# Patient Record
Sex: MALE | Race: WHITE | NOT HISPANIC OR LATINO | URBAN - METROPOLITAN AREA
[De-identification: names, ages, dates, MRNs, and addresses within clinical notes are randomized per-mention and may not be internally consistent; named-entity substitution may affect disease eponyms.]

---

## 2014-10-03 RX ORDER — METOPROLOL TARTRATE 50 MG
2 TABLET ORAL
Qty: 0 | Refills: 0 | COMMUNITY
Start: 2014-10-03

## 2017-07-23 ENCOUNTER — EMERGENCY (EMERGENCY)
Facility: HOSPITAL | Age: 82
LOS: 1 days | Discharge: HOME | End: 2017-07-23
Attending: EMERGENCY MEDICINE | Admitting: EMERGENCY MEDICINE
Payer: MEDICARE

## 2017-07-23 VITALS
HEART RATE: 79 BPM | TEMPERATURE: 98 F | SYSTOLIC BLOOD PRESSURE: 207 MMHG | DIASTOLIC BLOOD PRESSURE: 83 MMHG | RESPIRATION RATE: 16 BRPM | OXYGEN SATURATION: 99 %

## 2017-07-23 VITALS
DIASTOLIC BLOOD PRESSURE: 88 MMHG | SYSTOLIC BLOOD PRESSURE: 185 MMHG | OXYGEN SATURATION: 97 % | HEART RATE: 74 BPM | TEMPERATURE: 98 F | RESPIRATION RATE: 16 BRPM

## 2017-07-23 LAB
ALBUMIN SERPL ELPH-MCNC: 4.4 G/DL — SIGNIFICANT CHANGE UP (ref 3.3–5)
ALP SERPL-CCNC: 55 U/L — SIGNIFICANT CHANGE UP (ref 40–120)
ALT FLD-CCNC: 16 U/L RC — SIGNIFICANT CHANGE UP (ref 10–45)
ANION GAP SERPL CALC-SCNC: 16 MMOL/L — SIGNIFICANT CHANGE UP (ref 5–17)
APPEARANCE UR: CLEAR — SIGNIFICANT CHANGE UP
AST SERPL-CCNC: 26 U/L — SIGNIFICANT CHANGE UP (ref 10–40)
BASOPHILS # BLD AUTO: 0.1 K/UL — SIGNIFICANT CHANGE UP (ref 0–0.2)
BASOPHILS NFR BLD AUTO: 1.6 % — SIGNIFICANT CHANGE UP (ref 0–2)
BILIRUB SERPL-MCNC: 0.9 MG/DL — SIGNIFICANT CHANGE UP (ref 0.2–1.2)
BILIRUB UR-MCNC: NEGATIVE — SIGNIFICANT CHANGE UP
BUN SERPL-MCNC: 22 MG/DL — SIGNIFICANT CHANGE UP (ref 7–23)
CALCIUM SERPL-MCNC: 9.4 MG/DL — SIGNIFICANT CHANGE UP (ref 8.4–10.5)
CHLORIDE SERPL-SCNC: 102 MMOL/L — SIGNIFICANT CHANGE UP (ref 96–108)
CO2 SERPL-SCNC: 22 MMOL/L — SIGNIFICANT CHANGE UP (ref 22–31)
COLOR SPEC: COLORLESS — SIGNIFICANT CHANGE UP
CREAT SERPL-MCNC: 1.44 MG/DL — HIGH (ref 0.5–1.3)
DIFF PNL FLD: NEGATIVE — SIGNIFICANT CHANGE UP
EOSINOPHIL # BLD AUTO: 0.1 K/UL — SIGNIFICANT CHANGE UP (ref 0–0.5)
EOSINOPHIL NFR BLD AUTO: 2.1 % — SIGNIFICANT CHANGE UP (ref 0–6)
GLUCOSE SERPL-MCNC: 123 MG/DL — HIGH (ref 70–99)
GLUCOSE UR QL: NEGATIVE — SIGNIFICANT CHANGE UP
HCT VFR BLD CALC: 43 % — SIGNIFICANT CHANGE UP (ref 39–50)
HGB BLD-MCNC: 14.7 G/DL — SIGNIFICANT CHANGE UP (ref 13–17)
KETONES UR-MCNC: NEGATIVE — SIGNIFICANT CHANGE UP
LEUKOCYTE ESTERASE UR-ACNC: NEGATIVE — SIGNIFICANT CHANGE UP
LYMPHOCYTES # BLD AUTO: 2.1 K/UL — SIGNIFICANT CHANGE UP (ref 1–3.3)
LYMPHOCYTES # BLD AUTO: 38.2 % — SIGNIFICANT CHANGE UP (ref 13–44)
MAGNESIUM SERPL-MCNC: 2.1 MG/DL — SIGNIFICANT CHANGE UP (ref 1.6–2.6)
MCHC RBC-ENTMCNC: 32.1 PG — SIGNIFICANT CHANGE UP (ref 27–34)
MCHC RBC-ENTMCNC: 34.1 GM/DL — SIGNIFICANT CHANGE UP (ref 32–36)
MCV RBC AUTO: 94.2 FL — SIGNIFICANT CHANGE UP (ref 80–100)
MONOCYTES # BLD AUTO: 0.8 K/UL — SIGNIFICANT CHANGE UP (ref 0–0.9)
MONOCYTES NFR BLD AUTO: 14.3 % — HIGH (ref 2–14)
NEUTROPHILS # BLD AUTO: 2.4 K/UL — SIGNIFICANT CHANGE UP (ref 1.8–7.4)
NEUTROPHILS NFR BLD AUTO: 43.8 % — SIGNIFICANT CHANGE UP (ref 43–77)
NITRITE UR-MCNC: NEGATIVE — SIGNIFICANT CHANGE UP
PH UR: 6.5 — SIGNIFICANT CHANGE UP (ref 5–8)
PHOSPHATE SERPL-MCNC: 2.8 MG/DL — SIGNIFICANT CHANGE UP (ref 2.5–4.5)
PLATELET # BLD AUTO: 136 K/UL — LOW (ref 150–400)
POTASSIUM SERPL-MCNC: 4.4 MMOL/L — SIGNIFICANT CHANGE UP (ref 3.5–5.3)
POTASSIUM SERPL-SCNC: 4.4 MMOL/L — SIGNIFICANT CHANGE UP (ref 3.5–5.3)
PROT SERPL-MCNC: 7.9 G/DL — SIGNIFICANT CHANGE UP (ref 6–8.3)
PROT UR-MCNC: 100 MG/DL
RBC # BLD: 4.57 M/UL — SIGNIFICANT CHANGE UP (ref 4.2–5.8)
RBC # FLD: 12.8 % — SIGNIFICANT CHANGE UP (ref 10.3–14.5)
RBC CASTS # UR COMP ASSIST: SIGNIFICANT CHANGE UP /HPF (ref 0–2)
SODIUM SERPL-SCNC: 140 MMOL/L — SIGNIFICANT CHANGE UP (ref 135–145)
SP GR SPEC: 1.01 — SIGNIFICANT CHANGE UP (ref 1.01–1.02)
T3 SERPL-MCNC: 96 NG/DL — SIGNIFICANT CHANGE UP (ref 80–200)
T4 AB SER-ACNC: 7 UG/DL — SIGNIFICANT CHANGE UP (ref 4.6–12)
TROPONIN T SERPL-MCNC: <0.01 NG/ML — SIGNIFICANT CHANGE UP (ref 0–0.06)
TSH SERPL-MCNC: 2.04 UIU/ML — SIGNIFICANT CHANGE UP (ref 0.27–4.2)
UROBILINOGEN FLD QL: NEGATIVE — SIGNIFICANT CHANGE UP
WBC # BLD: 5.6 K/UL — SIGNIFICANT CHANGE UP (ref 3.8–10.5)
WBC # FLD AUTO: 5.6 K/UL — SIGNIFICANT CHANGE UP (ref 3.8–10.5)
WBC UR QL: SIGNIFICANT CHANGE UP /HPF (ref 0–5)

## 2017-07-23 PROCEDURE — 70450 CT HEAD/BRAIN W/O DYE: CPT | Mod: 26

## 2017-07-23 PROCEDURE — 84484 ASSAY OF TROPONIN QUANT: CPT

## 2017-07-23 PROCEDURE — 99284 EMERGENCY DEPT VISIT MOD MDM: CPT | Mod: GC

## 2017-07-23 PROCEDURE — 81001 URINALYSIS AUTO W/SCOPE: CPT

## 2017-07-23 PROCEDURE — 84480 ASSAY TRIIODOTHYRONINE (T3): CPT

## 2017-07-23 PROCEDURE — 93005 ELECTROCARDIOGRAM TRACING: CPT

## 2017-07-23 PROCEDURE — 85027 COMPLETE CBC AUTOMATED: CPT

## 2017-07-23 PROCEDURE — 84436 ASSAY OF TOTAL THYROXINE: CPT

## 2017-07-23 PROCEDURE — 84443 ASSAY THYROID STIM HORMONE: CPT

## 2017-07-23 PROCEDURE — 71046 X-RAY EXAM CHEST 2 VIEWS: CPT

## 2017-07-23 PROCEDURE — 84100 ASSAY OF PHOSPHORUS: CPT

## 2017-07-23 PROCEDURE — 71020: CPT | Mod: 26

## 2017-07-23 PROCEDURE — 70450 CT HEAD/BRAIN W/O DYE: CPT

## 2017-07-23 PROCEDURE — 80053 COMPREHEN METABOLIC PANEL: CPT

## 2017-07-23 PROCEDURE — 83735 ASSAY OF MAGNESIUM: CPT

## 2017-07-23 PROCEDURE — 99284 EMERGENCY DEPT VISIT MOD MDM: CPT | Mod: 25

## 2017-07-23 RX ORDER — ASPIRIN/CALCIUM CARB/MAGNESIUM 324 MG
0 TABLET ORAL
Qty: 0 | Refills: 0 | COMMUNITY

## 2017-07-23 NOTE — ED ADULT NURSE NOTE - PMH
Diabetes    HLD (hyperlipidemia)    HTN (hypertension)    Murmur, heart    TIA (transient ischemic attack)

## 2017-07-23 NOTE — ED PROVIDER NOTE - PROGRESS NOTE DETAILS
Dr. Kenyon Note: pt feeling well, BP down to 160s, no symptoms with walking.  Pt and daughter does not want to stay in hospital, will f/u with cardiologist, will try 2 cardiologist (including Dr. Reed).  Offered to call their cardiologist, but declined, will fu themselves.  Promises to return if more symptomatic or unable to f/u this week.  Aware of concern for symptomatic bradycardia although not bradycardic currently, and possibility of pacemaker need, risk for syncope in future.  Daughter is hospice physician and competent to make this shared decision.  Stable for dc, copy of labs and EKG to patient.

## 2017-07-23 NOTE — ED ADULT NURSE NOTE - OBJECTIVE STATEMENT
Pt c/o feeling lightheaded, BP measured at 200/100 at home with reported pulse in 30s.  Takes metoprolol 50 in am and 25 pm, ramipril 5mg x2.  Taking as instructed.  +generalized mild headache, denies vision changes, cp, sob, abd pain, nvd, numbness/tingling.  Lives at home with daughter.  Fell 1 week ago while walking the dog, feet got tangled, mechanical fall, denies LOC. Pt c/o feeling lightheaded, BP measured at 200/100 at home with reported pulse in 30s, taken by daughter who states she is a doctor.  Pt with hx of HTN takes metoprolol 50 in am and 25 pm, ramipril 5mg am and pm, taking all meds as instructed, no changes in dosing.  Lightheadedness has been "all day," not worse with walking, +generalized mild headache, denies vision changes, cp, sob, abd pain, nvd, numbness/tingling.  Lives at home with daughter.  Fell 1 week ago while walking the dog, feet got tangled, mechanical fall, denies LOC. Pt c/o feeling lightheaded, BP measured at 200/100 at home with reported pulse in 30s, taken by daughter who states she is a doctor.  Pt with hx of HTN takes metoprolol 50 in am and 25 pm, ramipril 5mg am and pm, taking all meds as instructed, no changes in dosing.  Lightheadedness has been "all day," not worse with walking, +generalized mild headache, denies vision changes, cp, sob, abd pain, nvd, numbness/tingling.  Lives at home with daughter who states pt is acting his normal self, no confusion, no slurred speech or facial droop.  Fell 1 week ago while walking the dog, feet got tangled, mechanical fall, denies LOC.

## 2017-07-23 NOTE — ED PROVIDER NOTE - MEDICAL DECISION MAKING DETAILS
Dr. Kenyon Note: concern for bradycardia...tele monitoring and lyte check, daughter ok with outpt has cards, for holter and echo outpt; pt with fall, no reported head inj, but check given high bp and elderly and recent fall.

## 2017-07-23 NOTE — ED PROVIDER NOTE - OBJECTIVE STATEMENT
Dr. Kenyon Note: 89M with mechanical fall on grass few days ago, but here today for elevated BP for few days to 190s along with periods of lightheadedness and noted palpable bradycardia to 30s (by pt's daughter, physician).  Intermittent episodes lasting minutes.  None currently.  +very slight headache, no changes to medications, no fever, cp, sob, ab pain, vomiting.

## 2017-07-28 ENCOUNTER — APPOINTMENT (OUTPATIENT)
Dept: CARDIOLOGY | Facility: CLINIC | Age: 82
End: 2017-07-28
Payer: MEDICARE

## 2017-07-28 ENCOUNTER — APPOINTMENT (OUTPATIENT)
Dept: CV DIAGNOSITCS | Facility: HOSPITAL | Age: 82
End: 2017-07-28

## 2017-07-28 ENCOUNTER — OUTPATIENT (OUTPATIENT)
Dept: OUTPATIENT SERVICES | Facility: HOSPITAL | Age: 82
LOS: 1 days | End: 2017-07-28
Payer: MEDICARE

## 2017-07-28 ENCOUNTER — NON-APPOINTMENT (OUTPATIENT)
Age: 82
End: 2017-07-28

## 2017-07-28 VITALS
WEIGHT: 153 LBS | HEART RATE: 50 BPM | OXYGEN SATURATION: 99 % | SYSTOLIC BLOOD PRESSURE: 175 MMHG | DIASTOLIC BLOOD PRESSURE: 62 MMHG | BODY MASS INDEX: 21.42 KG/M2 | HEIGHT: 71 IN

## 2017-07-28 DIAGNOSIS — Z95.2 PRESENCE OF PROSTHETIC HEART VALVE: ICD-10-CM

## 2017-07-28 PROCEDURE — 93306 TTE W/DOPPLER COMPLETE: CPT

## 2017-07-28 PROCEDURE — 93306 TTE W/DOPPLER COMPLETE: CPT | Mod: 26

## 2017-07-28 PROCEDURE — 99214 OFFICE O/P EST MOD 30 MIN: CPT

## 2017-07-28 PROCEDURE — 93000 ELECTROCARDIOGRAM COMPLETE: CPT

## 2017-07-28 RX ORDER — SULFAMETHOXAZOLE AND TRIMETHOPRIM 800; 160 MG/1; MG/1
800-160 TABLET ORAL
Qty: 10 | Refills: 0 | Status: DISCONTINUED | COMMUNITY
Start: 2017-06-11

## 2017-08-18 ENCOUNTER — APPOINTMENT (OUTPATIENT)
Dept: CARDIOLOGY | Facility: CLINIC | Age: 82
End: 2017-08-18
Payer: MEDICARE

## 2017-08-18 ENCOUNTER — NON-APPOINTMENT (OUTPATIENT)
Age: 82
End: 2017-08-18

## 2017-08-18 VITALS
HEART RATE: 55 BPM | BODY MASS INDEX: 21.42 KG/M2 | OXYGEN SATURATION: 100 % | HEIGHT: 71 IN | WEIGHT: 153 LBS | DIASTOLIC BLOOD PRESSURE: 80 MMHG | SYSTOLIC BLOOD PRESSURE: 173 MMHG

## 2017-08-18 DIAGNOSIS — E78.5 HYPERLIPIDEMIA, UNSPECIFIED: ICD-10-CM

## 2017-08-18 PROCEDURE — 93000 ELECTROCARDIOGRAM COMPLETE: CPT

## 2017-08-18 PROCEDURE — 99214 OFFICE O/P EST MOD 30 MIN: CPT

## 2017-08-25 ENCOUNTER — APPOINTMENT (OUTPATIENT)
Dept: CARDIOLOGY | Facility: CLINIC | Age: 82
End: 2017-08-25

## 2018-06-25 ENCOUNTER — OTHER (OUTPATIENT)
Age: 83
End: 2018-06-25

## 2018-10-05 ENCOUNTER — APPOINTMENT (OUTPATIENT)
Dept: CARDIOTHORACIC SURGERY | Facility: CLINIC | Age: 83
End: 2018-10-05

## 2018-10-05 ENCOUNTER — APPOINTMENT (OUTPATIENT)
Dept: CV DIAGNOSITCS | Facility: HOSPITAL | Age: 83
End: 2018-10-05

## 2018-11-02 ENCOUNTER — OUTPATIENT (OUTPATIENT)
Dept: OUTPATIENT SERVICES | Facility: HOSPITAL | Age: 83
LOS: 1 days | End: 2018-11-02
Payer: MEDICARE

## 2018-11-02 ENCOUNTER — NON-APPOINTMENT (OUTPATIENT)
Age: 83
End: 2018-11-02

## 2018-11-02 ENCOUNTER — APPOINTMENT (OUTPATIENT)
Dept: CARDIOTHORACIC SURGERY | Facility: CLINIC | Age: 83
End: 2018-11-02
Payer: SUBSIDIZED

## 2018-11-02 ENCOUNTER — APPOINTMENT (OUTPATIENT)
Dept: CV DIAGNOSITCS | Facility: HOSPITAL | Age: 83
End: 2018-11-02

## 2018-11-02 VITALS
HEART RATE: 58 BPM | RESPIRATION RATE: 14 BRPM | OXYGEN SATURATION: 100 % | BODY MASS INDEX: 21 KG/M2 | HEIGHT: 71 IN | DIASTOLIC BLOOD PRESSURE: 71 MMHG | TEMPERATURE: 97.5 F | SYSTOLIC BLOOD PRESSURE: 163 MMHG | WEIGHT: 150 LBS

## 2018-11-02 VITALS — SYSTOLIC BLOOD PRESSURE: 162 MMHG | DIASTOLIC BLOOD PRESSURE: 81 MMHG

## 2018-11-02 VITALS — SYSTOLIC BLOOD PRESSURE: 146 MMHG | DIASTOLIC BLOOD PRESSURE: 78 MMHG

## 2018-11-02 DIAGNOSIS — Z95.2 PRESENCE OF PROSTHETIC HEART VALVE: ICD-10-CM

## 2018-11-02 DIAGNOSIS — I10 ESSENTIAL (PRIMARY) HYPERTENSION: ICD-10-CM

## 2018-11-02 PROCEDURE — 93306 TTE W/DOPPLER COMPLETE: CPT | Mod: 26

## 2018-11-02 PROCEDURE — 93306 TTE W/DOPPLER COMPLETE: CPT

## 2018-11-02 PROCEDURE — 0: CUSTOM

## 2018-11-02 PROCEDURE — 99214 OFFICE O/P EST MOD 30 MIN: CPT

## 2018-11-02 PROCEDURE — 93000 ELECTROCARDIOGRAM COMPLETE: CPT

## 2019-04-14 ENCOUNTER — INPATIENT (INPATIENT)
Facility: HOSPITAL | Age: 84
LOS: 3 days | Discharge: HOME | DRG: 682 | End: 2019-04-18
Attending: HOSPITALIST | Admitting: HOSPITALIST
Payer: MEDICARE

## 2019-04-14 VITALS
HEIGHT: 70 IN | SYSTOLIC BLOOD PRESSURE: 167 MMHG | HEART RATE: 110 BPM | RESPIRATION RATE: 18 BRPM | DIASTOLIC BLOOD PRESSURE: 73 MMHG | OXYGEN SATURATION: 96 % | TEMPERATURE: 99 F | WEIGHT: 156.09 LBS

## 2019-04-14 DIAGNOSIS — I35.0 NONRHEUMATIC AORTIC (VALVE) STENOSIS: ICD-10-CM

## 2019-04-14 DIAGNOSIS — R74.0 NONSPECIFIC ELEVATION OF LEVELS OF TRANSAMINASE AND LACTIC ACID DEHYDROGENASE [LDH]: ICD-10-CM
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
## 2019-04-14 DIAGNOSIS — R65.10 SYSTEMIC INFLAMMATORY RESPONSE SYNDROME (SIRS) OF NON-INFECTIOUS ORIGIN WITHOUT ACUTE ORGAN DYSFUNCTION: ICD-10-CM

## 2019-04-14 DIAGNOSIS — R50.9 FEVER, UNSPECIFIED: ICD-10-CM

## 2019-04-14 DIAGNOSIS — E11.9 TYPE 2 DIABETES MELLITUS WITHOUT COMPLICATIONS: ICD-10-CM

## 2019-04-14 DIAGNOSIS — E78.5 HYPERLIPIDEMIA, UNSPECIFIED: ICD-10-CM

## 2019-04-14 DIAGNOSIS — R53.1 WEAKNESS: ICD-10-CM

## 2019-04-14 DIAGNOSIS — N17.9 ACUTE KIDNEY FAILURE, UNSPECIFIED: ICD-10-CM

## 2019-04-14 DIAGNOSIS — Z29.9 ENCOUNTER FOR PROPHYLACTIC MEASURES, UNSPECIFIED: ICD-10-CM

## 2019-04-14 DIAGNOSIS — I10 ESSENTIAL (PRIMARY) HYPERTENSION: ICD-10-CM

## 2019-04-14 LAB
ALBUMIN SERPL ELPH-MCNC: 3.5 G/DL — SIGNIFICANT CHANGE UP (ref 3.3–5)
ALP SERPL-CCNC: 104 U/L — SIGNIFICANT CHANGE UP (ref 40–120)
ALT FLD-CCNC: 64 U/L — HIGH (ref 10–45)
ANION GAP SERPL CALC-SCNC: 15 MMOL/L — SIGNIFICANT CHANGE UP (ref 5–17)
APPEARANCE UR: ABNORMAL
AST SERPL-CCNC: 51 U/L — HIGH (ref 10–40)
BACTERIA # UR AUTO: 0 — SIGNIFICANT CHANGE UP
BASOPHILS # BLD AUTO: 0 K/UL — SIGNIFICANT CHANGE UP (ref 0–0.2)
BASOPHILS NFR BLD AUTO: 0.2 % — SIGNIFICANT CHANGE UP (ref 0–2)
BILIRUB SERPL-MCNC: 0.7 MG/DL — SIGNIFICANT CHANGE UP (ref 0.2–1.2)
BILIRUB UR-MCNC: NEGATIVE — SIGNIFICANT CHANGE UP
BUN SERPL-MCNC: 34 MG/DL — HIGH (ref 7–23)
CALCIUM SERPL-MCNC: 8.8 MG/DL — SIGNIFICANT CHANGE UP (ref 8.4–10.5)
CHLORIDE SERPL-SCNC: 99 MMOL/L — SIGNIFICANT CHANGE UP (ref 96–108)
CO2 SERPL-SCNC: 20 MMOL/L — LOW (ref 22–31)
COLOR SPEC: YELLOW — SIGNIFICANT CHANGE UP
CREAT SERPL-MCNC: 1.87 MG/DL — HIGH (ref 0.5–1.3)
DIFF PNL FLD: ABNORMAL
EOSINOPHIL # BLD AUTO: 0.1 K/UL — SIGNIFICANT CHANGE UP (ref 0–0.5)
EOSINOPHIL NFR BLD AUTO: 1.7 % — SIGNIFICANT CHANGE UP (ref 0–6)
EPI CELLS # UR: 2 /HPF — SIGNIFICANT CHANGE UP
GAS PNL BLDV: SIGNIFICANT CHANGE UP
GLUCOSE SERPL-MCNC: 223 MG/DL — HIGH (ref 70–99)
GLUCOSE UR QL: ABNORMAL
GRAN CASTS # UR COMP ASSIST: 4 /LPF — SIGNIFICANT CHANGE UP
HCT VFR BLD CALC: 32.6 % — LOW (ref 39–50)
HGB BLD-MCNC: 11.1 G/DL — LOW (ref 13–17)
HYALINE CASTS # UR AUTO: 3 /LPF — SIGNIFICANT CHANGE UP (ref 0–7)
KETONES UR-MCNC: NEGATIVE — SIGNIFICANT CHANGE UP
LEUKOCYTE ESTERASE UR-ACNC: NEGATIVE — SIGNIFICANT CHANGE UP
LYMPHOCYTES # BLD AUTO: 0.7 K/UL — LOW (ref 1–3.3)
LYMPHOCYTES # BLD AUTO: 9.8 % — LOW (ref 13–44)
MCHC RBC-ENTMCNC: 31.1 PG — SIGNIFICANT CHANGE UP (ref 27–34)
MCHC RBC-ENTMCNC: 33.9 GM/DL — SIGNIFICANT CHANGE UP (ref 32–36)
MCV RBC AUTO: 91.7 FL — SIGNIFICANT CHANGE UP (ref 80–100)
MONOCYTES # BLD AUTO: 0.9 K/UL — SIGNIFICANT CHANGE UP (ref 0–0.9)
MONOCYTES NFR BLD AUTO: 11.8 % — SIGNIFICANT CHANGE UP (ref 2–14)
NEUTROPHILS # BLD AUTO: 5.8 K/UL — SIGNIFICANT CHANGE UP (ref 1.8–7.4)
NEUTROPHILS NFR BLD AUTO: 76.6 % — SIGNIFICANT CHANGE UP (ref 43–77)
NITRITE UR-MCNC: NEGATIVE — SIGNIFICANT CHANGE UP
PH UR: 6 — SIGNIFICANT CHANGE UP (ref 5–8)
PLATELET # BLD AUTO: 171 K/UL — SIGNIFICANT CHANGE UP (ref 150–400)
POTASSIUM SERPL-MCNC: 4.4 MMOL/L — SIGNIFICANT CHANGE UP (ref 3.5–5.3)
POTASSIUM SERPL-SCNC: 4.4 MMOL/L — SIGNIFICANT CHANGE UP (ref 3.5–5.3)
PROT SERPL-MCNC: 7 G/DL — SIGNIFICANT CHANGE UP (ref 6–8.3)
PROT UR-MCNC: ABNORMAL
RAPID RVP RESULT: SIGNIFICANT CHANGE UP
RBC # BLD: 3.56 M/UL — LOW (ref 4.2–5.8)
RBC # FLD: 12.6 % — SIGNIFICANT CHANGE UP (ref 10.3–14.5)
RBC CASTS # UR COMP ASSIST: 1 /HPF — SIGNIFICANT CHANGE UP (ref 0–4)
SODIUM SERPL-SCNC: 134 MMOL/L — LOW (ref 135–145)
SP GR SPEC: 1.02 — SIGNIFICANT CHANGE UP (ref 1.01–1.02)
TROPONIN T, HIGH SENSITIVITY RESULT: 58 NG/L — HIGH (ref 0–51)
UROBILINOGEN FLD QL: NEGATIVE — SIGNIFICANT CHANGE UP
WBC # BLD: 7.6 K/UL — SIGNIFICANT CHANGE UP (ref 3.8–10.5)
WBC # FLD AUTO: 7.6 K/UL — SIGNIFICANT CHANGE UP (ref 3.8–10.5)
WBC UR QL: 3 /HPF — SIGNIFICANT CHANGE UP (ref 0–5)

## 2019-04-14 PROCEDURE — 71045 X-RAY EXAM CHEST 1 VIEW: CPT | Mod: 26

## 2019-04-14 PROCEDURE — 99223 1ST HOSP IP/OBS HIGH 75: CPT | Mod: GC

## 2019-04-14 PROCEDURE — 72170 X-RAY EXAM OF PELVIS: CPT | Mod: 26

## 2019-04-14 PROCEDURE — 99284 EMERGENCY DEPT VISIT MOD MDM: CPT | Mod: GC

## 2019-04-14 PROCEDURE — 73080 X-RAY EXAM OF ELBOW: CPT | Mod: 26,LT

## 2019-04-14 PROCEDURE — 73060 X-RAY EXAM OF HUMERUS: CPT | Mod: 26,LT

## 2019-04-14 PROCEDURE — 70450 CT HEAD/BRAIN W/O DYE: CPT | Mod: 26

## 2019-04-14 RX ORDER — VANCOMYCIN HCL 1 G
1000 VIAL (EA) INTRAVENOUS ONCE
Qty: 0 | Refills: 0 | Status: COMPLETED | OUTPATIENT
Start: 2019-04-14 | End: 2019-04-14

## 2019-04-14 RX ORDER — CEFEPIME 1 G/1
2000 INJECTION, POWDER, FOR SOLUTION INTRAMUSCULAR; INTRAVENOUS EVERY 24 HOURS
Qty: 0 | Refills: 0 | Status: DISCONTINUED | OUTPATIENT
Start: 2019-04-15 | End: 2019-04-15

## 2019-04-14 RX ORDER — CEFEPIME 1 G/1
INJECTION, POWDER, FOR SOLUTION INTRAMUSCULAR; INTRAVENOUS
Qty: 0 | Refills: 0 | Status: DISCONTINUED | OUTPATIENT
Start: 2019-04-14 | End: 2019-04-15

## 2019-04-14 RX ORDER — CEFEPIME 1 G/1
2000 INJECTION, POWDER, FOR SOLUTION INTRAMUSCULAR; INTRAVENOUS ONCE
Qty: 0 | Refills: 0 | Status: COMPLETED | OUTPATIENT
Start: 2019-04-14 | End: 2019-04-14

## 2019-04-14 RX ORDER — SODIUM CHLORIDE 9 MG/ML
1000 INJECTION, SOLUTION INTRAVENOUS
Qty: 0 | Refills: 0 | Status: DISCONTINUED | OUTPATIENT
Start: 2019-04-14 | End: 2019-04-18

## 2019-04-14 RX ORDER — TETANUS TOXOID, REDUCED DIPHTHERIA TOXOID AND ACELLULAR PERTUSSIS VACCINE, ADSORBED 5; 2.5; 8; 8; 2.5 [IU]/.5ML; [IU]/.5ML; UG/.5ML; UG/.5ML; UG/.5ML
0.5 SUSPENSION INTRAMUSCULAR ONCE
Qty: 0 | Refills: 0 | Status: COMPLETED | OUTPATIENT
Start: 2019-04-14 | End: 2019-04-14

## 2019-04-14 RX ORDER — DEXTROSE 50 % IN WATER 50 %
25 SYRINGE (ML) INTRAVENOUS ONCE
Qty: 0 | Refills: 0 | Status: DISCONTINUED | OUTPATIENT
Start: 2019-04-14 | End: 2019-04-18

## 2019-04-14 RX ORDER — SODIUM CHLORIDE 9 MG/ML
1000 INJECTION INTRAMUSCULAR; INTRAVENOUS; SUBCUTANEOUS ONCE
Qty: 0 | Refills: 0 | Status: COMPLETED | OUTPATIENT
Start: 2019-04-14 | End: 2019-04-14

## 2019-04-14 RX ORDER — HEPARIN SODIUM 5000 [USP'U]/ML
5000 INJECTION INTRAVENOUS; SUBCUTANEOUS EVERY 8 HOURS
Qty: 0 | Refills: 0 | Status: DISCONTINUED | OUTPATIENT
Start: 2019-04-14 | End: 2019-04-18

## 2019-04-14 RX ORDER — GLUCAGON INJECTION, SOLUTION 0.5 MG/.1ML
1 INJECTION, SOLUTION SUBCUTANEOUS ONCE
Qty: 0 | Refills: 0 | Status: DISCONTINUED | OUTPATIENT
Start: 2019-04-14 | End: 2019-04-18

## 2019-04-14 RX ORDER — DOCUSATE SODIUM 100 MG
100 CAPSULE ORAL THREE TIMES A DAY
Qty: 0 | Refills: 0 | Status: DISCONTINUED | OUTPATIENT
Start: 2019-04-14 | End: 2019-04-18

## 2019-04-14 RX ORDER — INSULIN LISPRO 100/ML
VIAL (ML) SUBCUTANEOUS
Qty: 0 | Refills: 0 | Status: DISCONTINUED | OUTPATIENT
Start: 2019-04-14 | End: 2019-04-18

## 2019-04-14 RX ORDER — RAMIPRIL 5 MG
2 CAPSULE ORAL
Qty: 0 | Refills: 0 | COMMUNITY

## 2019-04-14 RX ORDER — FOLIC ACID 0.8 MG
1 TABLET ORAL DAILY
Qty: 0 | Refills: 0 | Status: DISCONTINUED | OUTPATIENT
Start: 2019-04-14 | End: 2019-04-14

## 2019-04-14 RX ORDER — ACETAMINOPHEN 500 MG
975 TABLET ORAL ONCE
Qty: 0 | Refills: 0 | Status: COMPLETED | OUTPATIENT
Start: 2019-04-14 | End: 2019-04-14

## 2019-04-14 RX ORDER — DEXTROSE 50 % IN WATER 50 %
15 SYRINGE (ML) INTRAVENOUS ONCE
Qty: 0 | Refills: 0 | Status: DISCONTINUED | OUTPATIENT
Start: 2019-04-14 | End: 2019-04-18

## 2019-04-14 RX ORDER — DEXTROSE 50 % IN WATER 50 %
12.5 SYRINGE (ML) INTRAVENOUS ONCE
Qty: 0 | Refills: 0 | Status: DISCONTINUED | OUTPATIENT
Start: 2019-04-14 | End: 2019-04-18

## 2019-04-14 RX ORDER — GENTAMICIN SULFATE 40 MG/ML
70 VIAL (ML) INJECTION ONCE
Qty: 0 | Refills: 0 | Status: COMPLETED | OUTPATIENT
Start: 2019-04-14 | End: 2019-04-14

## 2019-04-14 RX ORDER — ASPIRIN/CALCIUM CARB/MAGNESIUM 324 MG
81 TABLET ORAL DAILY
Qty: 0 | Refills: 0 | Status: DISCONTINUED | OUTPATIENT
Start: 2019-04-14 | End: 2019-04-18

## 2019-04-14 RX ADMIN — Medication 100 MILLIGRAM(S): at 18:52

## 2019-04-14 RX ADMIN — Medication 975 MILLIGRAM(S): at 08:00

## 2019-04-14 RX ADMIN — Medication 1: at 18:50

## 2019-04-14 RX ADMIN — CEFEPIME 100 MILLIGRAM(S): 1 INJECTION, POWDER, FOR SOLUTION INTRAMUSCULAR; INTRAVENOUS at 15:52

## 2019-04-14 RX ADMIN — Medication 250 MILLIGRAM(S): at 10:51

## 2019-04-14 RX ADMIN — SODIUM CHLORIDE 1000 MILLILITER(S): 9 INJECTION INTRAMUSCULAR; INTRAVENOUS; SUBCUTANEOUS at 10:50

## 2019-04-14 RX ADMIN — SODIUM CHLORIDE 1000 MILLILITER(S): 9 INJECTION INTRAMUSCULAR; INTRAVENOUS; SUBCUTANEOUS at 07:00

## 2019-04-14 RX ADMIN — HEPARIN SODIUM 5000 UNIT(S): 5000 INJECTION INTRAVENOUS; SUBCUTANEOUS at 18:50

## 2019-04-14 RX ADMIN — Medication 101.75 MILLIGRAM(S): at 12:28

## 2019-04-14 RX ADMIN — Medication 975 MILLIGRAM(S): at 18:06

## 2019-04-14 RX ADMIN — Medication 81 MILLIGRAM(S): at 18:52

## 2019-04-14 RX ADMIN — Medication 2: at 21:50

## 2019-04-14 RX ADMIN — TETANUS TOXOID, REDUCED DIPHTHERIA TOXOID AND ACELLULAR PERTUSSIS VACCINE, ADSORBED 0.5 MILLILITER(S): 5; 2.5; 8; 8; 2.5 SUSPENSION INTRAMUSCULAR at 07:47

## 2019-04-14 RX ADMIN — Medication 975 MILLIGRAM(S): at 17:44

## 2019-04-14 NOTE — ED PROVIDER NOTE - PHYSICAL EXAMINATION
NEURO: pupils 3 mm, PERRL, EOMI (CN III, IV, VI), facial sensation intact to light touch in all 3 divisions bilat (CN V), face is symmetric with normal eye closure, eye opening, and smile (CN VII), hearing is normal to rubbing fingers (CN VII), palate elevates symmetrically, phonation is normal (CN IX, X),  shoulder shrug intact bilat (CN XI), tongue is midline with nl movements and no atrophy (CN XII), finger to nose test nl bilat, negative pronator drift bilat, speech is clear; 5/5 motor strength BUE and BLE: deltoids, biceps, triceps, wrist flexors/extensors, hand , hip flexors, knee flexors/extensors, plantar/dorsiflexors, hallux flexors/extensors; sensation intact to light touch BUE and BLE: C5-T1 and L3-S1   TMs clear b/l nl light reflex  spine: no c/t/l spinal ttp or stepoffs   skin: skin superficial laceration to L elbow

## 2019-04-14 NOTE — ED PROVIDER NOTE - ATTENDING CONTRIBUTION TO CARE
tavr, dm htn febirle sat afternoon given apap and then sunday AM fall at home, no reported head trauma or LOC. fall unwitnessed. left olacranon abrasion. fever, fall no lace repair required. labs , imaging, apap. likely admit 89 yo M tavr, dm htn febrile sat afternoon given apap and then sunday AM fall at home, no reported head trauma or LOC. fall unwitnessed. left olacranon abrasion. nad. mental status at baseline per family.   fever, fall no lac repair required. labs , imaging, apap. likely admit

## 2019-04-14 NOTE — H&P ADULT - NSHPLABSRESULTS_GEN_ALL_CORE
11.1   7.6   )-----------( 171      ( 2019 07:19 )             32.6       -14    134<L>  |  99  |  34<H>  ----------------------------<  223<H>  4.4   |  20<L>  |  1.87<H>    Ca    8.8      2019 07:19    TPro  7.0  /  Alb  3.5  /  TBili  0.7  /  DBili  x   /  AST  51<H>  /  ALT  64<H>  /  AlkPhos  104                Urinalysis Basic - ( 2019 07:57 )    Color: Yellow / Appearance: Slightly Turbid / S.021 / pH: x  Gluc: x / Ketone: Negative  / Bili: Negative / Urobili: Negative   Blood: x / Protein: 100 mg/dL / Nitrite: Negative   Leuk Esterase: Negative / RBC: 1 /hpf / WBC 3 /HPF   Sq Epi: x / Non Sq Epi: 2 /hpf / Bacteria: 0.0            Lactate Trend            CAPILLARY BLOOD GLUCOSE      POCT Blood Glucose.: 219 mg/dL (2019 06:51)    < from: CT Head No Cont (19 @ 08:30) >    IMPRESSION:  Microvascular disease. 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  No acute intracranial hemorrhage.    < end of copied text >    < from: Xray Humerus, Left (19 @ 06:45) >    IMPRESSION:  No acute fracture or dislocation.    < from: Xray Elbow AP + Lateral + Oblique, Left (19 @ 06:45) >        < end of copied text >    < from: Xray Pelvis AP only (19 @ 06:44) >    IMPRESSION:  No acute displaced fracture.    < end of copied text >

## 2019-04-14 NOTE — H&P ADULT - ATTENDING COMMENTS
Unknown source of fevers.  Agree with work up listed above.  Continue broad spectrum antibiotics.  Monitor on tele for 24 hours, and if no events, can discontinue.  Follow up with PT evaluation.  Consult ID.  Check ECHO.  Follow up cultures.

## 2019-04-14 NOTE — H&P ADULT - PROBLEM SELECTOR PLAN 2
met sepsis criteria with fever and tachycardia s/p Vanc and gent in the ED.  - management as above multiple fever both at home and in the ED. Unclear source as UA negative and CXR clear. RVP negative. S/p Vanc and gentamycin in the ED.  - management as above 2 falls over the 2 dayswithout prior fall history. Also h/o AS a/p TAVR. CTH w/o acute pathologies. Xrays without fractures of the chest & elbow & pelvis. Etiology broad include cardiac arrythmia vs. orthostatic vs. mechanical vs. infection related vs. TIA. EKG unchanged from prior with PACs.  - telemetry for 24 hrs, if no events, will d/c  - fall precautions  - check orthostatic   - PT consulted, shital recs  - also possible TIA, will monitor for symptoms, and MRI head if concerning for septic emboli/TIA

## 2019-04-14 NOTE — H&P ADULT - NSHPSOCIALHISTORY_GEN_ALL_CORE
Lives at home, fully functional, able to do all ADLs. Former smoker, 1ppd, quitted in 1975. Reports 8oz wine with dinner, no recreational drug use.

## 2019-04-14 NOTE — H&P ADULT - NSHPPHYSICALEXAM_GEN_ALL_CORE
PHYSICAL EXAM:  GENERAL: NAD, well-developed  HEAD:  Atraumatic, Normocephalic  EYES: EOMI, PERRLA, conjunctiva and sclera clear  NECK: Supple, No JVD  CHEST/LUNG: Clear to auscultation bilaterally; No wheeze  HEART: Regular rate and rhythm; No murmurs, rubs, or gallops  ABDOMEN: Soft, Nontender, Nondistended; Bowel sounds present  EXTREMITIES:  2+ Peripheral Pulses, No clubbing, cyanosis, or edema  PSYCH: AAOx3  NEUROLOGY: non-focal  SKIN: No rashes or lesions PHYSICAL EXAM:  GENERAL: NAD, well-developed, AAOx4  HEAD:  Atraumatic, Normocephalic  EYES: EOMI, PERRLA, conjunctiva and sclera clear  NECK: Supple  CHEST/LUNG: Clear to auscultation bilaterally; No wheeze  HEART: Regular rate and rhythm;  ABDOMEN: Soft, Nondistended; Bowel sounds present, mild tenderness of the LLQ  EXTREMITIES:  2+ Peripheral Pulses, No clubbing, cyanosis, or edema. 5/5 strength bilaterally UE and LE.   NEUROLOGY: non-focal  SKIN: No rashes. Two 2cm lacerations on the L elbow, mild woozing noted PHYSICAL EXAM:  GENERAL: NAD, well-developed, AAOx4  HEAD:  Atraumatic, Normocephalic  EYES: EOMI, PERRLA, conjunctiva and sclera clear  NECK: Supple  CHEST/LUNG: Clear to auscultation bilaterally; No wheeze  HEART: Regular rate and rhythm; systolic murmur noted  ABDOMEN: Soft, Nondistended; Bowel sounds present, mild tenderness of the LLQ  EXTREMITIES:  2+ Peripheral Pulses, No clubbing, cyanosis, or edema. 5/5 strength bilaterally UE and LE.   NEUROLOGY: non-focal  SKIN: No rashes. Two 2cm lacerations on the L elbow, mild woozing noted

## 2019-04-14 NOTE — ED PROVIDER NOTE - OBJECTIVE STATEMENT
89 yo M c PMH of TAVR (4 y/a), DM, HTN presents s/p fall x 2 at home - pt lives with daughter had generalized weakness and fever to 102F then fell to the ground due to weakness, resulting in laceration of L elbow. pt and family deny head trauma or LOC, 1 fall was unwitnessed but family ran in right away. only blood thinner is ASA. no hx of falls. lives with daughter ambulates independently A&Ox2 at baseline.

## 2019-04-14 NOTE — H&P ADULT - NSICDXPASTMEDICALHX_GEN_ALL_CORE_FT
PAST MEDICAL HISTORY:  Diabetes     HLD (hyperlipidemia)     HTN (hypertension)     Murmur, heart     TIA (transient ischemic attack)

## 2019-04-14 NOTE — H&P ADULT - PROBLEM SELECTOR PLAN 7
- Mildly elevated transaminitis, will cont to trend  - hold Atorvastatin for now given elevated AST/ALT

## 2019-04-14 NOTE — H&P ADULT - ASSESSMENT
89 yo M c PMH of HTN, AS S/P TAVR , HLD, TIA presents s/p fall x2, found to meet SIRs criteria in the ED, admitted for fever of unclear etiology and fall workup.

## 2019-04-14 NOTE — H&P ADULT - PROBLEM SELECTOR PLAN 6
mildly elevated, unclear reason, 2/2 statin vs. sepsis related?  - daily CMP  - hold statin for now h/o AS s/p TAVR. Last echo in 2018 with EF 51%   - c/w ASA  - will check Echo given fever of unclear etiology, ? endocarditis

## 2019-04-14 NOTE — ED PROVIDER NOTE - NS ED ROS FT
ROS positive: fall, weakness, fever, coryza, L elbow laceration, abdominal papin  ROS negative: congestion, cough, pharyngitis, hemoptysis, LOC, n/v/d, dysuria, hematuria, melena, hematachezia

## 2019-04-14 NOTE — H&P ADULT - PROBLEM SELECTOR PLAN 3
2 falls over the last week without prior fall history. Also h/o AS a/p TAVR. CTH w/o acute pathologies. Xrays without fractures of the chest & elbow & pelvis. Etiology broad include cardiac arrythmia vs. orthostatic vs. mechanical vs. infection related. EKG unchanged from prior with PACs.  - fall precautions  - check orthostatic   - PT consulted, shital recs met sepsis criteria with fever and tachycardia s/p Vanc and gent in the ED. Multiple fever both at home and in the ED. Unclear source as UA negative and CXR clear. RVP negative.   - management as above

## 2019-04-14 NOTE — H&P ADULT - PROBLEM SELECTOR PLAN 8
- takes ramipril and metoprolol at home  - BP stable 122/64  - hold off restarting home meds given concern for sepsis of unclear etiology and OTTO

## 2019-04-14 NOTE — ED ADULT NURSE NOTE - NSIMPLEMENTINTERV_GEN_ALL_ED
Implemented All Fall with Harm Risk Interventions:  [place] to call system. Call bell, personal items and telephone within reach. Instruct patient to call for assistance. Room bathroom lighting operational. Non-slip footwear when patient is off stretcher. Physically safe environment: no spills, clutter or unnecessary equipment. Stretcher in lowest position, wheels locked, appropriate side rails in place. Provide visual cue, wrist band, yellow gown, etc. Monitor gait and stability. Monitor for mental status changes and reorient to person, place, and time. Review medications for side effects contributing to fall risk. Reinforce activity limits and safety measures with patient and family. Provide visual clues: red socks.

## 2019-04-14 NOTE — H&P ADULT - PROBLEM SELECTOR PLAN 4
OTTO on CKD. Cr- up to 1.8 this admission, Cr- baseline 1.4 from 2017. Unclear etiology. s/p 2L IVF  - unclear baseline, as last Cr- of 1.4 in 2017  - patient appears mildly dry on physical exam, likely pre-renal given h/o poor PO  - will trend BMP for now  - if continues to uptrend, will send urine studies

## 2019-04-14 NOTE — ED PROVIDER NOTE - PROGRESS NOTE DETAILS
no obvious infectious source. Patient has hx of prosthetic valve. will tx emperically for endocarditis.

## 2019-04-14 NOTE — ED PROVIDER NOTE - CLINICAL SUMMARY MEDICAL DECISION MAKING FREE TEXT BOX
89 yo M c PMH of TAVR (4 y/a), DM, HTN presents s/p fall x 2 at home - pt lives with daughter had generalized weakness and fever to 102F then fell to the ground due to weakness, resulting in laceration of L elbow. On exam, , /73, T99.3F VS otherwise wnl, laceration to L elbow, non-focal neuro exam. w/u: labs/bcs/cxr/xray pelvis/vbg/troponin/ekg. ddx: sepsis/acs/flu/orthostasis/hypoglycemia. tx: IVF. will reassess.

## 2019-04-14 NOTE — H&P ADULT - PROBLEM SELECTOR PLAN 9
PPX: HSQ  Diet:  Dispo: PT consulted - home home Onglyza 25mg daily, never on insulin prior  - ISS for now  - AM A1C

## 2019-04-14 NOTE — ED ADULT NURSE REASSESSMENT NOTE - NS ED NURSE REASSESS COMMENT FT1
pt states has no complaints; family at bedside; administered tetanus; pt tolerated well; safety/comfort maintained

## 2019-04-14 NOTE — H&P ADULT - PROBLEM SELECTOR PROBLEM 2
OTTO (acute kidney injury) Fever, unspecified fever cause SIRS (systemic inflammatory response syndrome) Fall

## 2019-04-14 NOTE — ED ADULT NURSE NOTE - OBJECTIVE STATEMENT
89yo male with hx DM, HTN, presents s/p fall x 2 at home. Pt had fever and weakness prior to fall. Denies head injury/LOC. Laceration to left elbow. Denies hx of falls. Pt lives with daughter.

## 2019-04-14 NOTE — H&P ADULT - PROBLEM SELECTOR PLAN 5
h/o AS s/p TAVR. Last echo in 2016 with mild pulm HTN. No systolic dysfunction noted.   - c/w ASA  - will check Echo given fever of unclear etiology, ? endocarditis  - trop peaked - possibly 2/2 OTTO vs. type 2 ischemia mildly elevated, unclear reason, 2/2 statin vs. sepsis related?   - daily CMP  - hold statin for now

## 2019-04-14 NOTE — PATIENT PROFILE ADULT - NSPROIMPLANTSMEDDEV_GEN_A_NUR
Per Protocol  LOV  5/29/18  NOV  8/27/18  Last filled 7/5/18     Megan UG alprazolam 0.25 mg  #30     Please advise       Heart valve

## 2019-04-14 NOTE — H&P ADULT - NSHPREVIEWOFSYSTEMS_GEN_ALL_CORE

## 2019-04-14 NOTE — H&P ADULT - PROBLEM SELECTOR PLAN 1
2 falls over the last week without prior fall history. Also h/o AS a/p multiple fever both at home and in the ED. Unclear source as UA negative and CXR clear. S/p Vanc and gent in the ED.  - c/w Vanc & Zosyn for now given unknown source  - f/u UCx and BCx  - echo ordered to r/o endocardiditis   - if recurrent fever, will pan-scan  - consider ID consult chapo met sepsis criteria with fever and tachycardia s/p Vanc and gent in the ED. multiple fever both at home and in the ED. Unclear source as UA negative and CXR clear. RVP negative.   - consider vanc and cefepime for now  - f/u UCx and BCx  - echo ordered to r/o endocardiditis   - if recurrent fever, will pan-scan  - consider ID consult chapo multiple fever both at home and in the ED. S/p Vanc and Gentamycin in the ED. Differentials broad including PNA (CXR normal, RVP negative) vs. UTI (UA negative for infection) vs. meningitis (no physical exam concerning for meningitis) vs. abdominal source vs. DVT (patient is mobile) vs. myocarditis vs. endocarditis given h/o TAVR vs. cellulitis (no skin lesions noted except for L elbow laceration)  - Vanc and cefepime for now  - f/u UCx and BCx  - echo ordered to r/o endocarditis and myocarditis  - if recurrent fever, will pan-scan and start gentamycin  - consider ID consult chapo  - trop peaked - possibly 2/2 type 2 ischemia vs. not been cleared by OTTO

## 2019-04-14 NOTE — H&P ADULT - HISTORY OF PRESENT ILLNESS
89 yo M c PMH of TAVR (4 y/a), DM, HTN presents s/p fall x 2 at home - pt lives with daughter had generalized weakness and fever to 102F then fell to the ground due to weakness, resulting in laceration of L elbow. pt and family deny head trauma or LOC, 1 fall was unwitnessed but family ran in right away. only blood thinner is ASA. no hx of falls. lives with daughter ambulates independently A&Ox2 at baseline. 89 yo M c PMH of TAVR (4 y/a), DM, HTN presents s/p fall x 2 at home over the last week. Per patient, he has been having imbalance issues for years and fell twice over the last week, one unwitnessed and one witnessed byu grandson. Denies dizziness, palpitation, CP or SOB prior to the fall. Was on the floor for a couple of minutes before the family helped him to get up. No LOC and denies hitting his head. He did landed on the left elbow with the last fall and L elbow lacerations were noted. Patient is otherwise independent and walks with a cane. No prior h/o falls.    He lives at  - pt lives with daughter had generalized weakness and fever to 102F then fell to the ground due to weakness, resulting in laceration of L elbow. pt and family deny head trauma or LOC, 1 fall was unwitnessed but family ran in right away. only blood thinner is ASA. no hx of falls. lives with daughter ambulates independently A&Ox2 at baseline. 89 yo M c PMH of HTN, AS S/P TAVR , HLD, TIA presents s/p fall x 2 at home over the last week. Per patient, he has been having imbalance issues for years and fell twice over the last week, one unwitnessed and one witnessed byu grandson. Also noted to have a fever yesterday. Otherwise denies dizziness, palpitation, CP or SOB prior to the fall. Was on the floor for a couple of minutes before the family helped him to get up. No LOC and denies hitting his head. He did landed on the left elbow with the last fall and L elbow lacerations were noted. Patient is otherwise independent and walks with a cane. No prior h/o falls. Only takes ASA. No sick contact.  In the ED, Tmax 101.4F and tachy to 110. No WBCs, H&H with mild anemia, Cr- of 1.8 (baseline 1.44). Mild transaminitis (AST/ALT, 51/64), UA & RVP negative. CTH and X-ray negative for acute pathologies.   Admitted to medicine for fall and fever workup. 89 yo M c PMH of HTN, AS S/P TAVR , HLD, TIA  and DMT2 presents s/p fall x 2 and fever. Per patient and daughter Darleen, he has been having imbalance issues for years. Started having poor appetite on Friday, and fell twice Saturday and today morning. Per daughter (who is a doctor), patient appears weaker yesterday, needing more assist walking around and noted to have a fever of 102F. He also fell twice, once landed on the elbow and the second time landed on both hands and knees. Patient denies dizziness, palpitation, CP or SOB prior to the fall. Was on the floor for a couple of minutes before the family helped him to get up. No LOC and denies hitting his head. Patient is otherwise independent with ADLs and IADLs and walks with a cane. No prior h/o falls. Only takes ASA. No sick contact.  In the ED, Tmax 101.4F and tachy to 110. No WBCs, H&H with mild anemia, Cr- of 1.8 (baseline 1.44). Mild transaminitis (AST/ALT, 51/64), UA & RVP negative. CTH and X-ray negative for acute pathologies.   Admitted to medicine for fall and fever workup.

## 2019-04-15 LAB
ALBUMIN SERPL ELPH-MCNC: 2.9 G/DL — LOW (ref 3.3–5)
ALP SERPL-CCNC: 101 U/L — SIGNIFICANT CHANGE UP (ref 40–120)
ALT FLD-CCNC: 54 U/L — HIGH (ref 10–45)
ANION GAP SERPL CALC-SCNC: 15 MMOL/L — SIGNIFICANT CHANGE UP (ref 5–17)
AST SERPL-CCNC: 47 U/L — HIGH (ref 10–40)
BASOPHILS # BLD AUTO: 0.03 K/UL — SIGNIFICANT CHANGE UP (ref 0–0.2)
BASOPHILS NFR BLD AUTO: 0.3 % — SIGNIFICANT CHANGE UP (ref 0–2)
BILIRUB SERPL-MCNC: 0.5 MG/DL — SIGNIFICANT CHANGE UP (ref 0.2–1.2)
BUN SERPL-MCNC: 27 MG/DL — HIGH (ref 7–23)
CALCIUM SERPL-MCNC: 8.2 MG/DL — LOW (ref 8.4–10.5)
CHLORIDE SERPL-SCNC: 99 MMOL/L — SIGNIFICANT CHANGE UP (ref 96–108)
CO2 SERPL-SCNC: 20 MMOL/L — LOW (ref 22–31)
CREAT SERPL-MCNC: 1.53 MG/DL — HIGH (ref 0.5–1.3)
CULTURE RESULTS: NO GROWTH — SIGNIFICANT CHANGE UP
EOSINOPHIL # BLD AUTO: 0.06 K/UL — SIGNIFICANT CHANGE UP (ref 0–0.5)
EOSINOPHIL NFR BLD AUTO: 0.6 % — SIGNIFICANT CHANGE UP (ref 0–6)
GLUCOSE BLDC GLUCOMTR-MCNC: 141 MG/DL — HIGH (ref 70–99)
GLUCOSE BLDC GLUCOMTR-MCNC: 197 MG/DL — HIGH (ref 70–99)
GLUCOSE BLDC GLUCOMTR-MCNC: 199 MG/DL — HIGH (ref 70–99)
GLUCOSE SERPL-MCNC: 155 MG/DL — HIGH (ref 70–99)
HBA1C BLD-MCNC: 7.6 % — HIGH (ref 4–5.6)
HCT VFR BLD CALC: 29.6 % — LOW (ref 39–50)
HGB BLD-MCNC: 10 G/DL — LOW (ref 13–17)
IMM GRANULOCYTES NFR BLD AUTO: 0.7 % — SIGNIFICANT CHANGE UP (ref 0–1.5)
LYMPHOCYTES # BLD AUTO: 1.41 K/UL — SIGNIFICANT CHANGE UP (ref 1–3.3)
LYMPHOCYTES # BLD AUTO: 13.6 % — SIGNIFICANT CHANGE UP (ref 13–44)
MAGNESIUM SERPL-MCNC: 1.9 MG/DL — SIGNIFICANT CHANGE UP (ref 1.6–2.6)
MCHC RBC-ENTMCNC: 30.5 PG — SIGNIFICANT CHANGE UP (ref 27–34)
MCHC RBC-ENTMCNC: 33.8 GM/DL — SIGNIFICANT CHANGE UP (ref 32–36)
MCV RBC AUTO: 90.2 FL — SIGNIFICANT CHANGE UP (ref 80–100)
MONOCYTES # BLD AUTO: 1.07 K/UL — HIGH (ref 0–0.9)
MONOCYTES NFR BLD AUTO: 10.3 % — SIGNIFICANT CHANGE UP (ref 2–14)
NEUTROPHILS # BLD AUTO: 7.76 K/UL — HIGH (ref 1.8–7.4)
NEUTROPHILS NFR BLD AUTO: 74.5 % — SIGNIFICANT CHANGE UP (ref 43–77)
PHOSPHATE SERPL-MCNC: 2 MG/DL — LOW (ref 2.5–4.5)
PLATELET # BLD AUTO: 176 K/UL — SIGNIFICANT CHANGE UP (ref 150–400)
POTASSIUM SERPL-MCNC: 4.1 MMOL/L — SIGNIFICANT CHANGE UP (ref 3.5–5.3)
POTASSIUM SERPL-SCNC: 4.1 MMOL/L — SIGNIFICANT CHANGE UP (ref 3.5–5.3)
PROT SERPL-MCNC: 6.5 G/DL — SIGNIFICANT CHANGE UP (ref 6–8.3)
RBC # BLD: 3.28 M/UL — LOW (ref 4.2–5.8)
RBC # FLD: 13.4 % — SIGNIFICANT CHANGE UP (ref 10.3–14.5)
SODIUM SERPL-SCNC: 134 MMOL/L — LOW (ref 135–145)
SPECIMEN SOURCE: SIGNIFICANT CHANGE UP
WBC # BLD: 10.4 K/UL — SIGNIFICANT CHANGE UP (ref 3.8–10.5)
WBC # FLD AUTO: 10.4 K/UL — SIGNIFICANT CHANGE UP (ref 3.8–10.5)

## 2019-04-15 PROCEDURE — 99233 SBSQ HOSP IP/OBS HIGH 50: CPT | Mod: GC

## 2019-04-15 RX ORDER — SODIUM,POTASSIUM PHOSPHATES 278-250MG
1 POWDER IN PACKET (EA) ORAL
Qty: 0 | Refills: 0 | Status: COMPLETED | OUTPATIENT
Start: 2019-04-15 | End: 2019-04-15

## 2019-04-15 RX ADMIN — Medication 1 TABLET(S): at 18:55

## 2019-04-15 RX ADMIN — Medication 1 TABLET(S): at 13:51

## 2019-04-15 RX ADMIN — HEPARIN SODIUM 5000 UNIT(S): 5000 INJECTION INTRAVENOUS; SUBCUTANEOUS at 06:16

## 2019-04-15 RX ADMIN — Medication 1 TABLET(S): at 11:46

## 2019-04-15 RX ADMIN — Medication 1: at 22:37

## 2019-04-15 RX ADMIN — HEPARIN SODIUM 5000 UNIT(S): 5000 INJECTION INTRAVENOUS; SUBCUTANEOUS at 21:26

## 2019-04-15 RX ADMIN — Medication 81 MILLIGRAM(S): at 11:46

## 2019-04-15 RX ADMIN — Medication 1 TABLET(S): at 21:26

## 2019-04-15 RX ADMIN — HEPARIN SODIUM 5000 UNIT(S): 5000 INJECTION INTRAVENOUS; SUBCUTANEOUS at 13:52

## 2019-04-15 RX ADMIN — Medication 1: at 19:59

## 2019-04-15 NOTE — PROGRESS NOTE ADULT - SUBJECTIVE AND OBJECTIVE BOX
CONTACT Caren Henson MD                                                                                                                                              Internal Medicine PGY-1   [facility] Pager [phone], [facility] Pager [phone]  On weekdays after 7pm and weekends after 12pm, please contact [phone]      Patient is a 89y old  Male who presents with a chief complaint of Fall (2019 12:00)      INTERVAL HPI/OVERNIGHT EVENTS:  - No acute events overnight       T(C): 37.2 (04-15-19 @ 08:25), Max: 37.8 (04-15-19 @ 05:10)  HR: 80 (04-15-19 @ 08:25) (74 - 84)  BP: 135/74 (04-15-19 @ 08:25) (124/59 - 155/72)  RR: 18 (04-15-19 @ 08:25) (16 - 25)  SpO2: 95% (04-15-19 @ 08:25) (91% - 97%)        I&O's Summary    2019 07:01  -  15 Apr 2019 07:00  --------------------------------------------------------  IN: 120 mL / OUT: 400 mL / NET: -280 mL        LABS:                        11.1   7.6   )-----------( 171      ( 2019 07:19 )             32.6     04-15    134<L>  |  99  |  27<H>  ----------------------------<  155<H>  4.1   |  20<L>  |  1.53<H>    Ca    8.2<L>      15 Apr 2019 06:14  Phos  2.0     04-15  Mg     1.9     -15    TPro  6.5  /  Alb  2.9<L>  /  TBili  0.5  /  DBili  x   /  AST  47<H>  /  ALT  54<H>  /  AlkPhos  101  04-15      Urinalysis Basic - ( 2019 07:57 )    Color: Yellow / Appearance: Slightly Turbid / S.021 / pH: x  Gluc: x / Ketone: Negative  / Bili: Negative / Urobili: Negative   Blood: x / Protein: 100 mg/dL / Nitrite: Negative   Leuk Esterase: Negative / RBC: 1 /hpf / WBC 3 /HPF   Sq Epi: x / Non Sq Epi: 2 /hpf / Bacteria: 0.0      CAPILLARY BLOOD GLUCOSE      POCT Blood Glucose.: 148 mg/dL (15 Apr 2019 08:53)  POCT Blood Glucose.: 215 mg/dL (2019 21:44)  POCT Blood Glucose.: 187 mg/dL (2019 18:00)      Urinalysis Basic - ( 2019 07:57 )    Color: Yellow / Appearance: Slightly Turbid / S.021 / pH: x  Gluc: x / Ketone: Negative  / Bili: Negative / Urobili: Negative   Blood: x / Protein: 100 mg/dL / Nitrite: Negative   Leuk Esterase: Negative / RBC: 1 /hpf / WBC 3 /HPF   Sq Epi: x / Non Sq Epi: 2 /hpf / Bacteria: 0.0        HOSPITAL MEDICATIONS:    MEDICATIONS  (STANDING):  aspirin  chewable 81 milliGRAM(s) Oral daily  cefepime   IVPB      cefepime   IVPB 2000 milliGRAM(s) IV Intermittent every 24 hours  dextrose 5%. 1000 milliLiter(s) (50 mL/Hr) IV Continuous <Continuous>  dextrose 50% Injectable 12.5 Gram(s) IV Push once  dextrose 50% Injectable 25 Gram(s) IV Push once  dextrose 50% Injectable 25 Gram(s) IV Push once  heparin  Injectable 5000 Unit(s) SubCutaneous every 8 hours  insulin lispro (HumaLOG) corrective regimen sliding scale   SubCutaneous Before meals and at bedtime  potassium acid phosphate/sodium acid phosphate tablet (K-PHOS No. 2) 1 Tablet(s) Oral four times a day with meals      MEDICATIONS  (PRN):  dextrose 40% Gel 15 Gram(s) Oral once PRN Blood Glucose LESS THAN 70 milliGRAM(s)/deciliter  docusate sodium 100 milliGRAM(s) Oral three times a day PRN Constipation  glucagon  Injectable 1 milliGRAM(s) IntraMuscular once PRN Glucose LESS THAN 70 milligrams/deciliter      HOME MEDICATIONS  Home Medications:  aspirin 81 mg oral delayed release capsule:  orally  (2017 17:27)  atorvastatin 10 mg oral tablet: 1 tab(s) orally once a day (at bedtime) (2017 17:27)  metoprolol tartrate 25 mg oral tablet: 2 tab(s) orally in the morning  and  1 tab orally at night (2019 13:43)  Onglyza 2.5 mg oral tablet: 1 tab(s) orally once a day (2017 17:27)  ramipril 5 mg oral capsule: 2 cap(s) orally once a day (2019 13:42) CONTACT Caren Henson MD                                                                                                                                              Internal Medicine PGY-1   [facility] Pager [phone], [facility] Pager [phone]  On weekdays after 7pm and weekends after 12pm, please contact [phone]      Patient is a 89y old  Male who presents with a chief complaint of Fall (2019 12:00)      INTERVAL HPI/OVERNIGHT EVENTS:  - No acute events overnight   - Denies CP, SOB, HA, dizziness, n/v, dysuria  - TTE pending. Tele: sinus 80 - low 100s, PACs, PVCs       T(C): 37.2 (04-15-19 @ 08:25), Max: 37.8 (04-15-19 @ 05:10)  HR: 80 (04-15-19 @ 08:25) (74 - 84)  BP: 135/74 (04-15-19 @ 08:25) (124/59 - 155/72)  RR: 18 (04-15-19 @ 08:25) (16 - 25)  SpO2: 95% (04-15-19 @ 08:25) (91% - 97%)    PHYSICAL EXAM:  GENERAL: NAD, well-developed  HEAD:  Atraumatic, Normocephalic  EYES: EOMI, PERRLA, conjunctiva and sclera clear  NECK: Supple, No JVD  CHEST/LUNG: Clear to auscultation bilaterally; No wheeze  HEART: Regular rate and rhythm; No murmurs, rubs, or gallops  ABDOMEN: Soft, Nontender, Nondistended; Bowel sounds present  EXTREMITIES:  2+ Peripheral Pulses, No clubbing, cyanosis, or edema  PSYCH: AAOx3  NEUROLOGY: non-focal  SKIN: No rashes or lesions        I&O's Summary    2019 07:01  -  15 Apr 2019 07:00  --------------------------------------------------------  IN: 120 mL / OUT: 400 mL / NET: -280 mL        LABS:                        11.1   7.6   )-----------( 171      ( 2019 07:19 )             32.6     04-15    134<L>  |  99  |  27<H>  ----------------------------<  155<H>  4.1   |  20<L>  |  1.53<H>    Ca    8.2<L>      15 Apr 2019 06:14  Phos  2.0     04-15  Mg     1.9     04-15    TPro  6.5  /  Alb  2.9<L>  /  TBili  0.5  /  DBili  x   /  AST  47<H>  /  ALT  54<H>  /  AlkPhos  101  04-15      Urinalysis Basic - ( 2019 07:57 )    Color: Yellow / Appearance: Slightly Turbid / S.021 / pH: x  Gluc: x / Ketone: Negative  / Bili: Negative / Urobili: Negative   Blood: x / Protein: 100 mg/dL / Nitrite: Negative   Leuk Esterase: Negative / RBC: 1 /hpf / WBC 3 /HPF   Sq Epi: x / Non Sq Epi: 2 /hpf / Bacteria: 0.0      CAPILLARY BLOOD GLUCOSE      POCT Blood Glucose.: 148 mg/dL (15 Apr 2019 08:53)  POCT Blood Glucose.: 215 mg/dL (2019 21:44)  POCT Blood Glucose.: 187 mg/dL (2019 18:00)      Urinalysis Basic - ( 2019 07:57 )    Color: Yellow / Appearance: Slightly Turbid / S.021 / pH: x  Gluc: x / Ketone: Negative  / Bili: Negative / Urobili: Negative   Blood: x / Protein: 100 mg/dL / Nitrite: Negative   Leuk Esterase: Negative / RBC: 1 /hpf / WBC 3 /HPF   Sq Epi: x / Non Sq Epi: 2 /hpf / Bacteria: 0.0        HOSPITAL MEDICATIONS:    MEDICATIONS  (STANDING):  aspirin  chewable 81 milliGRAM(s) Oral daily  cefepime   IVPB      cefepime   IVPB 2000 milliGRAM(s) IV Intermittent every 24 hours  dextrose 5%. 1000 milliLiter(s) (50 mL/Hr) IV Continuous <Continuous>  dextrose 50% Injectable 12.5 Gram(s) IV Push once  dextrose 50% Injectable 25 Gram(s) IV Push once  dextrose 50% Injectable 25 Gram(s) IV Push once  heparin  Injectable 5000 Unit(s) SubCutaneous every 8 hours  insulin lispro (HumaLOG) corrective regimen sliding scale   SubCutaneous Before meals and at bedtime  potassium acid phosphate/sodium acid phosphate tablet (K-PHOS No. 2) 1 Tablet(s) Oral four times a day with meals      MEDICATIONS  (PRN):  dextrose 40% Gel 15 Gram(s) Oral once PRN Blood Glucose LESS THAN 70 milliGRAM(s)/deciliter  docusate sodium 100 milliGRAM(s) Oral three times a day PRN Constipation  glucagon  Injectable 1 milliGRAM(s) IntraMuscular once PRN Glucose LESS THAN 70 milligrams/deciliter      HOME MEDICATIONS  Home Medications:  aspirin 81 mg oral delayed release capsule:  orally  (2017 17:27)  atorvastatin 10 mg oral tablet: 1 tab(s) orally once a day (at bedtime) (2017 17:27)  metoprolol tartrate 25 mg oral tablet: 2 tab(s) orally in the morning  and  1 tab orally at night (2019 13:43)  Onglyza 2.5 mg oral tablet: 1 tab(s) orally once a day (2017 17:27)  ramipril 5 mg oral capsule: 2 cap(s) orally once a day (2019 13:42)

## 2019-04-15 NOTE — PROGRESS NOTE ADULT - PROBLEM SELECTOR PLAN 1
multiple fever both at home and in the ED. S/p Vanc and Gentamycin in the ED. Differentials broad including PNA (CXR normal, RVP negative) vs. UTI (UA negative for infection) vs. abdominal source vs. DVT (patient is mobile) vs. myocarditis vs. endocarditis given h/o TAVR vs. cellulitis (no skin lesions noted except for L elbow laceration)  - HDS, afebrile and improving, bcx NGTD   - Low suspicion for infection process, so will dc Vanc and cefepime  - f/u TTE ordered to r/o endocarditis and myocarditis  - if recurrent fever, will pan-scan and start gentamycin  - trop peaked - possibly 2/2 type 2 ischemia vs. not been cleared by OTTO multiple fever both at home and in the ED. S/p Vanc and Gentamycin in the ED. Differentials broad including PNA (CXR normal, RVP negative) vs. UTI (UA negative for infection) vs. abdominal source vs. DVT (patient is mobile) vs. myocarditis vs. endocarditis given h/o TAVR vs. cellulitis (no skin lesions noted except for L elbow laceration)  - HDS, afebrile and improving, bcx NGTD   - Low suspicion for infection process, so will dc Vanc and cefepime  - No need for TTE to r/o endocarditis and myocarditis  - if recurrent fever, will pan-scan and start gentamycin  - trop peaked - possibly 2/2 type 2 ischemia vs. not been cleared by OTTO

## 2019-04-16 LAB
ANION GAP SERPL CALC-SCNC: 14 MMOL/L — SIGNIFICANT CHANGE UP (ref 5–17)
BUN SERPL-MCNC: 27 MG/DL — HIGH (ref 7–23)
CALCIUM SERPL-MCNC: 8.3 MG/DL — LOW (ref 8.4–10.5)
CHLORIDE SERPL-SCNC: 99 MMOL/L — SIGNIFICANT CHANGE UP (ref 96–108)
CO2 SERPL-SCNC: 21 MMOL/L — LOW (ref 22–31)
CREAT SERPL-MCNC: 1.48 MG/DL — HIGH (ref 0.5–1.3)
CULTURE RESULTS: SIGNIFICANT CHANGE UP
GLUCOSE BLDC GLUCOMTR-MCNC: 208 MG/DL — HIGH (ref 70–99)
GLUCOSE BLDC GLUCOMTR-MCNC: 228 MG/DL — HIGH (ref 70–99)
GLUCOSE BLDC GLUCOMTR-MCNC: 228 MG/DL — HIGH (ref 70–99)
GLUCOSE BLDC GLUCOMTR-MCNC: 270 MG/DL — HIGH (ref 70–99)
GLUCOSE SERPL-MCNC: 195 MG/DL — HIGH (ref 70–99)
GRAM STN FLD: SIGNIFICANT CHANGE UP
HCT VFR BLD CALC: 29.4 % — LOW (ref 39–50)
HGB BLD-MCNC: 9.7 G/DL — LOW (ref 13–17)
MAGNESIUM SERPL-MCNC: 1.9 MG/DL — SIGNIFICANT CHANGE UP (ref 1.6–2.6)
MCHC RBC-ENTMCNC: 29.7 PG — SIGNIFICANT CHANGE UP (ref 27–34)
MCHC RBC-ENTMCNC: 33 GM/DL — SIGNIFICANT CHANGE UP (ref 32–36)
MCV RBC AUTO: 89.9 FL — SIGNIFICANT CHANGE UP (ref 80–100)
PHOSPHATE SERPL-MCNC: 3 MG/DL — SIGNIFICANT CHANGE UP (ref 2.5–4.5)
PLATELET # BLD AUTO: 185 K/UL — SIGNIFICANT CHANGE UP (ref 150–400)
POTASSIUM SERPL-MCNC: 3.7 MMOL/L — SIGNIFICANT CHANGE UP (ref 3.5–5.3)
POTASSIUM SERPL-SCNC: 3.7 MMOL/L — SIGNIFICANT CHANGE UP (ref 3.5–5.3)
RBC # BLD: 3.27 M/UL — LOW (ref 4.2–5.8)
RBC # FLD: 13.2 % — SIGNIFICANT CHANGE UP (ref 10.3–14.5)
SODIUM SERPL-SCNC: 134 MMOL/L — LOW (ref 135–145)
SPECIMEN SOURCE: SIGNIFICANT CHANGE UP
WBC # BLD: 8.39 K/UL — SIGNIFICANT CHANGE UP (ref 3.8–10.5)
WBC # FLD AUTO: 8.39 K/UL — SIGNIFICANT CHANGE UP (ref 3.8–10.5)

## 2019-04-16 PROCEDURE — 99233 SBSQ HOSP IP/OBS HIGH 50: CPT

## 2019-04-16 PROCEDURE — 93306 TTE W/DOPPLER COMPLETE: CPT | Mod: 26

## 2019-04-16 RX ORDER — VANCOMYCIN HCL 1 G
750 VIAL (EA) INTRAVENOUS EVERY 12 HOURS
Qty: 0 | Refills: 0 | Status: DISCONTINUED | OUTPATIENT
Start: 2019-04-16 | End: 2019-04-16

## 2019-04-16 RX ORDER — VANCOMYCIN HCL 1 G
500 VIAL (EA) INTRAVENOUS EVERY 12 HOURS
Qty: 0 | Refills: 0 | Status: DISCONTINUED | OUTPATIENT
Start: 2019-04-16 | End: 2019-04-17

## 2019-04-16 RX ORDER — VANCOMYCIN HCL 1 G
1000 VIAL (EA) INTRAVENOUS EVERY 12 HOURS
Qty: 0 | Refills: 0 | Status: DISCONTINUED | OUTPATIENT
Start: 2019-04-16 | End: 2019-04-16

## 2019-04-16 RX ORDER — VANCOMYCIN HCL 1 G
1000 VIAL (EA) INTRAVENOUS ONCE
Qty: 0 | Refills: 0 | Status: COMPLETED | OUTPATIENT
Start: 2019-04-16 | End: 2019-04-16

## 2019-04-16 RX ORDER — BACITRACIN AND POLYMYXIN B SULFATE 500; 10000 [USP'U]/G; [USP'U]/G
1 OINTMENT TOPICAL AT BEDTIME
Qty: 0 | Refills: 0 | Status: DISCONTINUED | OUTPATIENT
Start: 2019-04-16 | End: 2019-04-18

## 2019-04-16 RX ORDER — PIPERACILLIN AND TAZOBACTAM 4; .5 G/20ML; G/20ML
3.38 INJECTION, POWDER, LYOPHILIZED, FOR SOLUTION INTRAVENOUS EVERY 8 HOURS
Qty: 0 | Refills: 0 | Status: DISCONTINUED | OUTPATIENT
Start: 2019-04-16 | End: 2019-04-16

## 2019-04-16 RX ADMIN — HEPARIN SODIUM 5000 UNIT(S): 5000 INJECTION INTRAVENOUS; SUBCUTANEOUS at 05:05

## 2019-04-16 RX ADMIN — BACITRACIN AND POLYMYXIN B SULFATE 1 APPLICATION(S): 500; 10000 OINTMENT TOPICAL at 21:31

## 2019-04-16 RX ADMIN — Medication 81 MILLIGRAM(S): at 09:56

## 2019-04-16 RX ADMIN — HEPARIN SODIUM 5000 UNIT(S): 5000 INJECTION INTRAVENOUS; SUBCUTANEOUS at 21:30

## 2019-04-16 RX ADMIN — PIPERACILLIN AND TAZOBACTAM 25 GRAM(S): 4; .5 INJECTION, POWDER, LYOPHILIZED, FOR SOLUTION INTRAVENOUS at 05:05

## 2019-04-16 RX ADMIN — HEPARIN SODIUM 5000 UNIT(S): 5000 INJECTION INTRAVENOUS; SUBCUTANEOUS at 13:43

## 2019-04-16 RX ADMIN — Medication 250 MILLIGRAM(S): at 09:56

## 2019-04-16 RX ADMIN — Medication 3: at 11:25

## 2019-04-16 RX ADMIN — Medication 2: at 21:31

## 2019-04-16 RX ADMIN — Medication 100 MILLIGRAM(S): at 21:30

## 2019-04-16 RX ADMIN — Medication 2: at 16:40

## 2019-04-16 RX ADMIN — Medication 2: at 07:39

## 2019-04-16 NOTE — PROGRESS NOTE ADULT - PROBLEM SELECTOR PLAN 1
multiple fever both at home and in the ED. S/p Vanc and Gentamycin in the ED. Differentials broad including PNA (CXR normal, RVP negative) vs. UTI (UA negative for infection) vs. abdominal source vs. DVT (patient is mobile) vs. myocarditis vs. endocarditis given h/o TAVR vs. cellulitis (no skin lesions noted except for L elbow laceration)  - HDS, afebrile and improving, bcx NGTD   - Low suspicion for infection process, so will dc Vanc and cefepime  - No need for TTE to r/o endocarditis and myocarditis  - if recurrent fever, will pan-scan and start gentamycin  - trop peaked - possibly 2/2 type 2 ischemia vs. not been cleared by OTTO

## 2019-04-16 NOTE — PHYSICAL THERAPY INITIAL EVALUATION ADULT - PERTINENT HX OF CURRENT PROBLEM, REHAB EVAL
Pt is a 89 yo M admitted to [facility] on 4/14/19 s/p fall x2, fever. Per dtr (who is a MD), appears weaker, needing more assist, landed on elbow, 2nd fall landed on hands and knees. In the ED, Tmax 101.4F and tachy to 110. No WBCs, H&H with mild anemia, Cr- of 1.8 (baseline 1.44). Mild transaminitis (AST/ALT, 51/64), UA & RVP negative.

## 2019-04-16 NOTE — PROGRESS NOTE ADULT - SUBJECTIVE AND OBJECTIVE BOX
MARIA GUADALUPE JUDITH  89y  Male      Subjective:       No acute overnight events. This am, no f/c/n/v/d/CP/SOB.    Meds    MEDICATIONS  (STANDING):  aspirin  chewable 81 milliGRAM(s) Oral daily  dextrose 5%. 1000 milliLiter(s) (50 mL/Hr) IV Continuous <Continuous>  dextrose 50% Injectable 12.5 Gram(s) IV Push once  dextrose 50% Injectable 25 Gram(s) IV Push once  dextrose 50% Injectable 25 Gram(s) IV Push once  heparin  Injectable 5000 Unit(s) SubCutaneous every 8 hours  insulin lispro (HumaLOG) corrective regimen sliding scale   SubCutaneous Before meals and at bedtime  piperacillin/tazobactam IVPB. 3.375 Gram(s) IV Intermittent every 8 hours    MEDICATIONS  (PRN):  dextrose 40% Gel 15 Gram(s) Oral once PRN Blood Glucose LESS THAN 70 milliGRAM(s)/deciliter  docusate sodium 100 milliGRAM(s) Oral three times a day PRN Constipation  glucagon  Injectable 1 milliGRAM(s) IntraMuscular once PRN Glucose LESS THAN 70 milligrams/deciliter        Vital Signs Last 24 Hrs  T(C): 37.1 (16 Apr 2019 04:01), Max: 37.2 (15 Apr 2019 19:58)  T(F): 98.7 (16 Apr 2019 04:01), Max: 99 (15 Apr 2019 19:58)  HR: 79 (16 Apr 2019 07:35) (79 - 84)  BP: 147/72 (16 Apr 2019 07:35) (145/64 - 158/71)  BP(mean): --  RR: 18 (16 Apr 2019 07:35) (18 - 18)  SpO2: 95% (16 Apr 2019 07:35) (95% - 100%)    PHYSICAL EXAM:  GENERAL: NAD, well-groomed, well-developed  HEENT - NC/AT, pupils equal and reactive to light,  ; Moist mucous membranes, Good dentition, No lesions  NECK: Supple, No JVD  CHEST/LUNG: Clear to auscultation bilaterally; No rales, rhonchi, wheezing  HEART: Regular rate and rhythm; No murmurs, rubs, or gallops  ABDOMEN: Soft, Nontender, Nondistended; Bowel sounds present  EXTREMITIES:  2+ Peripheral Pulses, No clubbing, cyanosis, or edema  NEURO:  No Focal deficits, sensory and motor intact  SKIN: No rashes or lesions    Consultant(s) Notes Reviewed:  [x ] YES  [ ] NO  Care Discussed with Consultants/Other Providers [ x] YES  [ ] NO    LABS:      The Labs were reviewed by me   The Radiology was reviewed by me    EKG tracing reviewed by me    04-16    134<L>  |  99  |  27<H>  ----------------------------<  195<H>  3.7   |  21<L>  |  1.48<H>  04-15    134<L>  |  99  |  27<H>  ----------------------------<  155<H>  4.1   |  20<L>  |  1.53<H>  04-14    134<L>  |  99  |  34<H>  ----------------------------<  223<H>  4.4   |  20<L>  |  1.87<H>    Ca    8.3<L>      16 Apr 2019 06:22  Ca    8.2<L>      15 Apr 2019 06:14  Ca    8.8      14 Apr 2019 07:19  Phos  3.0     04-16  Mg     1.9     04-16    TPro  6.5  /  Alb  2.9<L>  /  TBili  0.5  /  DBili  x   /  AST  47<H>  /  ALT  54<H>  /  AlkPhos  101  04-15  TPro  7.0  /  Alb  3.5  /  TBili  0.7  /  DBili  x   /  AST  51<H>  /  ALT  64<H>  /  AlkPhos  104  04-14    Magnesium, Serum: 1.9 mg/dL (04-16-19 @ 06:22)  Magnesium, Serum: 1.9 mg/dL (04-15-19 @ 06:14)    Phosphorus Level, Serum: 3.0 mg/dL (04-16-19 @ 06:22)  Phosphorus Level, Serum: 2.0 mg/dL (04-15-19 @ 06:14)                                              10.0   10.40 )-----------( 176      ( 15 Apr 2019 09:02 )             29.6                         11.1   7.6   )-----------( 171      ( 14 Apr 2019 07:19 )             32.6     CAPILLARY BLOOD GLUCOSE      POCT Blood Glucose.: 208 mg/dL (16 Apr 2019 07:22)  POCT Blood Glucose.: 199 mg/dL (15 Apr 2019 22:23)  POCT Blood Glucose.: 197 mg/dL (15 Apr 2019 19:48)  POCT Blood Glucose.: 141 mg/dL (15 Apr 2019 13:13)  POCT Blood Glucose.: 148 mg/dL (15 Apr 2019 08:53)          RADIOLOGY & ADDITIONAL TESTS: MARIA GUADALUPE JUDITH  89y  Male      Subjective:       Overnight: Patient's cultures grew GNR was restarted on Zosyn. This am, no f/c/n/v/d/CP/SOB.    Meds    MEDICATIONS  (STANDING):  aspirin  chewable 81 milliGRAM(s) Oral daily  dextrose 5%. 1000 milliLiter(s) (50 mL/Hr) IV Continuous <Continuous>  dextrose 50% Injectable 12.5 Gram(s) IV Push once  dextrose 50% Injectable 25 Gram(s) IV Push once  dextrose 50% Injectable 25 Gram(s) IV Push once  heparin  Injectable 5000 Unit(s) SubCutaneous every 8 hours  insulin lispro (HumaLOG) corrective regimen sliding scale   SubCutaneous Before meals and at bedtime  piperacillin/tazobactam IVPB. 3.375 Gram(s) IV Intermittent every 8 hours    MEDICATIONS  (PRN):  dextrose 40% Gel 15 Gram(s) Oral once PRN Blood Glucose LESS THAN 70 milliGRAM(s)/deciliter  docusate sodium 100 milliGRAM(s) Oral three times a day PRN Constipation  glucagon  Injectable 1 milliGRAM(s) IntraMuscular once PRN Glucose LESS THAN 70 milligrams/deciliter        Vital Signs Last 24 Hrs  T(C): 37.1 (16 Apr 2019 04:01), Max: 37.2 (15 Apr 2019 19:58)  T(F): 98.7 (16 Apr 2019 04:01), Max: 99 (15 Apr 2019 19:58)  HR: 79 (16 Apr 2019 07:35) (79 - 84)  BP: 147/72 (16 Apr 2019 07:35) (145/64 - 158/71)  BP(mean): --  RR: 18 (16 Apr 2019 07:35) (18 - 18)  SpO2: 95% (16 Apr 2019 07:35) (95% - 100%)    PHYSICAL EXAM:  GENERAL: NAD, well-groomed, well-developed  HEENT - NC/AT, pupils equal and reactive to light,  ; Moist mucous membranes, Good dentition, No lesions  NECK: Supple, No JVD  CHEST/LUNG: Clear to auscultation bilaterally; No rales, rhonchi, wheezing  HEART: Regular rate and rhythm; No murmurs, rubs, or gallops  ABDOMEN: Soft, Nontender, Nondistended; Bowel sounds present  EXTREMITIES:  2+ Peripheral Pulses, No clubbing, cyanosis, or edema  NEURO:  No Focal deficits, sensory and motor intact  SKIN: No rashes or lesions    Consultant(s) Notes Reviewed:  [x ] YES  [ ] NO  Care Discussed with Consultants/Other Providers [ x] YES  [ ] NO    LABS:      The Labs were reviewed by me   The Radiology was reviewed by me    EKG tracing reviewed by me    04-16    134<L>  |  99  |  27<H>  ----------------------------<  195<H>  3.7   |  21<L>  |  1.48<H>  04-15    134<L>  |  99  |  27<H>  ----------------------------<  155<H>  4.1   |  20<L>  |  1.53<H>  04-14    134<L>  |  99  |  34<H>  ----------------------------<  223<H>  4.4   |  20<L>  |  1.87<H>    Ca    8.3<L>      16 Apr 2019 06:22  Ca    8.2<L>      15 Apr 2019 06:14  Ca    8.8      14 Apr 2019 07:19  Phos  3.0     04-16  Mg     1.9     04-16    TPro  6.5  /  Alb  2.9<L>  /  TBili  0.5  /  DBili  x   /  AST  47<H>  /  ALT  54<H>  /  AlkPhos  101  04-15  TPro  7.0  /  Alb  3.5  /  TBili  0.7  /  DBili  x   /  AST  51<H>  /  ALT  64<H>  /  AlkPhos  104  04-14    Magnesium, Serum: 1.9 mg/dL (04-16-19 @ 06:22)  Magnesium, Serum: 1.9 mg/dL (04-15-19 @ 06:14)    Phosphorus Level, Serum: 3.0 mg/dL (04-16-19 @ 06:22)  Phosphorus Level, Serum: 2.0 mg/dL (04-15-19 @ 06:14)                                              10.0   10.40 )-----------( 176      ( 15 Apr 2019 09:02 )             29.6                         11.1   7.6   )-----------( 171      ( 14 Apr 2019 07:19 )             32.6     CAPILLARY BLOOD GLUCOSE      POCT Blood Glucose.: 208 mg/dL (16 Apr 2019 07:22)  POCT Blood Glucose.: 199 mg/dL (15 Apr 2019 22:23)  POCT Blood Glucose.: 197 mg/dL (15 Apr 2019 19:48)  POCT Blood Glucose.: 141 mg/dL (15 Apr 2019 13:13)  POCT Blood Glucose.: 148 mg/dL (15 Apr 2019 08:53)          RADIOLOGY & ADDITIONAL TESTS:

## 2019-04-16 NOTE — PHYSICAL THERAPY INITIAL EVALUATION ADULT - ADDITIONAL COMMENTS
pt lives at home with dtr, +few steps to engotiate, amb with cane, ind ADLs, dtr assists as available

## 2019-04-16 NOTE — PHYSICAL THERAPY INITIAL EVALUATION ADULT - PRECAUTIONS/LIMITATIONS, REHAB EVAL
CTH and X-ray negative for acute pathologies. fall precautions/CTH and X-ray negative for acute pathologies.

## 2019-04-16 NOTE — PHYSICAL THERAPY INITIAL EVALUATION ADULT - DISCHARGE DISPOSITION, PT EVAL
DC home with outpt PT services, assist from dtr as needed, recommend rolling walker, owns cane, PT spoke with dtr and agrees, note left for DEUCE Barton./outpatient PT

## 2019-04-17 ENCOUNTER — TRANSCRIPTION ENCOUNTER (OUTPATIENT)
Age: 84
End: 2019-04-17

## 2019-04-17 LAB
ANION GAP SERPL CALC-SCNC: 13 MMOL/L — SIGNIFICANT CHANGE UP (ref 5–17)
BUN SERPL-MCNC: 25 MG/DL — HIGH (ref 7–23)
CALCIUM SERPL-MCNC: 8.4 MG/DL — SIGNIFICANT CHANGE UP (ref 8.4–10.5)
CHLORIDE SERPL-SCNC: 99 MMOL/L — SIGNIFICANT CHANGE UP (ref 96–108)
CO2 SERPL-SCNC: 21 MMOL/L — LOW (ref 22–31)
CREAT SERPL-MCNC: 1.48 MG/DL — HIGH (ref 0.5–1.3)
GLUCOSE BLDC GLUCOMTR-MCNC: 181 MG/DL — HIGH (ref 70–99)
GLUCOSE BLDC GLUCOMTR-MCNC: 195 MG/DL — HIGH (ref 70–99)
GLUCOSE BLDC GLUCOMTR-MCNC: 251 MG/DL — HIGH (ref 70–99)
GLUCOSE BLDC GLUCOMTR-MCNC: 297 MG/DL — HIGH (ref 70–99)
GLUCOSE SERPL-MCNC: 160 MG/DL — HIGH (ref 70–99)
HCT VFR BLD CALC: 28.9 % — LOW (ref 39–50)
HGB BLD-MCNC: 9.9 G/DL — LOW (ref 13–17)
MAGNESIUM SERPL-MCNC: 1.8 MG/DL — SIGNIFICANT CHANGE UP (ref 1.6–2.6)
MCHC RBC-ENTMCNC: 30 PG — SIGNIFICANT CHANGE UP (ref 27–34)
MCHC RBC-ENTMCNC: 34.3 GM/DL — SIGNIFICANT CHANGE UP (ref 32–36)
MCV RBC AUTO: 87.6 FL — SIGNIFICANT CHANGE UP (ref 80–100)
PHOSPHATE SERPL-MCNC: 3.1 MG/DL — SIGNIFICANT CHANGE UP (ref 2.5–4.5)
PLATELET # BLD AUTO: 205 K/UL — SIGNIFICANT CHANGE UP (ref 150–400)
POTASSIUM SERPL-MCNC: 3.8 MMOL/L — SIGNIFICANT CHANGE UP (ref 3.5–5.3)
POTASSIUM SERPL-SCNC: 3.8 MMOL/L — SIGNIFICANT CHANGE UP (ref 3.5–5.3)
RBC # BLD: 3.3 M/UL — LOW (ref 4.2–5.8)
RBC # FLD: 13.3 % — SIGNIFICANT CHANGE UP (ref 10.3–14.5)
SODIUM SERPL-SCNC: 133 MMOL/L — LOW (ref 135–145)
WBC # BLD: 9.19 K/UL — SIGNIFICANT CHANGE UP (ref 3.8–10.5)
WBC # FLD AUTO: 9.19 K/UL — SIGNIFICANT CHANGE UP (ref 3.8–10.5)

## 2019-04-17 PROCEDURE — 99232 SBSQ HOSP IP/OBS MODERATE 35: CPT | Mod: GC

## 2019-04-17 RX ORDER — MAGNESIUM SULFATE 500 MG/ML
1 VIAL (ML) INJECTION ONCE
Qty: 0 | Refills: 0 | Status: COMPLETED | OUTPATIENT
Start: 2019-04-17 | End: 2019-04-17

## 2019-04-17 RX ADMIN — Medication 3: at 11:52

## 2019-04-17 RX ADMIN — Medication 100 MILLIGRAM(S): at 09:48

## 2019-04-17 RX ADMIN — Medication 1: at 21:31

## 2019-04-17 RX ADMIN — Medication 3: at 16:28

## 2019-04-17 RX ADMIN — HEPARIN SODIUM 5000 UNIT(S): 5000 INJECTION INTRAVENOUS; SUBCUTANEOUS at 05:30

## 2019-04-17 RX ADMIN — HEPARIN SODIUM 5000 UNIT(S): 5000 INJECTION INTRAVENOUS; SUBCUTANEOUS at 13:45

## 2019-04-17 RX ADMIN — Medication 100 GRAM(S): at 09:49

## 2019-04-17 RX ADMIN — BACITRACIN AND POLYMYXIN B SULFATE 1 APPLICATION(S): 500; 10000 OINTMENT TOPICAL at 21:31

## 2019-04-17 RX ADMIN — Medication 1: at 07:29

## 2019-04-17 RX ADMIN — Medication 81 MILLIGRAM(S): at 11:22

## 2019-04-17 RX ADMIN — HEPARIN SODIUM 5000 UNIT(S): 5000 INJECTION INTRAVENOUS; SUBCUTANEOUS at 21:31

## 2019-04-17 NOTE — PROGRESS NOTE ADULT - SUBJECTIVE AND OBJECTIVE BOX
MARIA GUADALUPE JUDITH  89y  Male      Subjective:     No acute overnight events. This am, patient denies f/c/n/v/d/CP/SOB.      Meds    MEDICATIONS  (STANDING):  aspirin  chewable 81 milliGRAM(s) Oral daily  BACItracin/polymyxin B Ointment 1 Application(s) Topical at bedtime  dextrose 5%. 1000 milliLiter(s) (50 mL/Hr) IV Continuous <Continuous>  dextrose 50% Injectable 12.5 Gram(s) IV Push once  dextrose 50% Injectable 25 Gram(s) IV Push once  dextrose 50% Injectable 25 Gram(s) IV Push once  heparin  Injectable 5000 Unit(s) SubCutaneous every 8 hours  insulin lispro (HumaLOG) corrective regimen sliding scale   SubCutaneous Before meals and at bedtime  magnesium sulfate  IVPB 1 Gram(s) IV Intermittent once  vancomycin  IVPB 500 milliGRAM(s) IV Intermittent every 12 hours    MEDICATIONS  (PRN):  dextrose 40% Gel 15 Gram(s) Oral once PRN Blood Glucose LESS THAN 70 milliGRAM(s)/deciliter  docusate sodium 100 milliGRAM(s) Oral three times a day PRN Constipation  glucagon  Injectable 1 milliGRAM(s) IntraMuscular once PRN Glucose LESS THAN 70 milligrams/deciliter        Vital Signs Last 24 Hrs  T(C): 36.7 (17 Apr 2019 07:27), Max: 36.8 (17 Apr 2019 04:09)  T(F): 98.1 (17 Apr 2019 07:27), Max: 98.3 (17 Apr 2019 04:09)  HR: 77 (17 Apr 2019 07:27) (77 - 82)  BP: 152/74 (17 Apr 2019 07:27) (122/67 - 152/74)  BP(mean): --  RR: 16 (17 Apr 2019 07:27) (16 - 18)  SpO2: 98% (17 Apr 2019 07:27) (95% - 98%)    PHYSICAL EXAM:  GENERAL: NAD, well-groomed, well-developed  HEENT - NC/AT, pupils equal and reactive to light  NECK: Supple, No JVD  CHEST/LUNG: Clear to auscultation bilaterally; No rales, rhonchi, wheezing  HEART: Regular rate and rhythm; No murmurs, rubs, or gallops  ABDOMEN: Soft, Nontender, Nondistended; Bowel sounds present  EXTREMITIES:  2+ Peripheral Pulses, No clubbing, cyanosis, or edema  NEURO:  No Focal deficits, sensory and motor intact  SKIN: No rashes or lesions    Consultant(s) Notes Reviewed:  [x ] YES  [ ] NO  Care Discussed with Consultants/Other Providers [ x] YES  [ ] NO    LABS:      The Labs were reviewed by me   The Radiology was reviewed by me    EKG tracing reviewed by me    04-17    133<L>  |  99  |  25<H>  ----------------------------<  160<H>  3.8   |  21<L>  |  1.48<H>  04-16    134<L>  |  99  |  27<H>  ----------------------------<  195<H>  3.7   |  21<L>  |  1.48<H>  04-15    134<L>  |  99  |  27<H>  ----------------------------<  155<H>  4.1   |  20<L>  |  1.53<H>    Ca    8.4      17 Apr 2019 06:40  Ca    8.3<L>      16 Apr 2019 06:22  Ca    8.2<L>      15 Apr 2019 06:14  Phos  3.1     04-17  Mg     1.8     04-17    TPro  6.5  /  Alb  2.9<L>  /  TBili  0.5  /  DBili  x   /  AST  47<H>  /  ALT  54<H>  /  AlkPhos  101  04-15    Magnesium, Serum: 1.8 mg/dL (04-17-19 @ 06:40)  Magnesium, Serum: 1.9 mg/dL (04-16-19 @ 06:22)  Magnesium, Serum: 1.9 mg/dL (04-15-19 @ 06:14)    Phosphorus Level, Serum: 3.1 mg/dL (04-17-19 @ 06:40)  Phosphorus Level, Serum: 3.0 mg/dL (04-16-19 @ 06:22)  Phosphorus Level, Serum: 2.0 mg/dL (04-15-19 @ 06:14)                                              9.7    8.39  )-----------( 185      ( 16 Apr 2019 09:11 )             29.4                         10.0   10.40 )-----------( 176      ( 15 Apr 2019 09:02 )             29.6     CAPILLARY BLOOD GLUCOSE      POCT Blood Glucose.: 181 mg/dL (17 Apr 2019 07:22)  POCT Blood Glucose.: 228 mg/dL (16 Apr 2019 21:04)  POCT Blood Glucose.: 228 mg/dL (16 Apr 2019 16:28)  POCT Blood Glucose.: 270 mg/dL (16 Apr 2019 11:21)          RADIOLOGY & ADDITIONAL TESTS:

## 2019-04-17 NOTE — PROGRESS NOTE ADULT - PROBLEM SELECTOR PLAN 1
Afebrile  - HDS, afebrile and improving, bcx NGTD   - Low suspicion for infection process, so will dc Vanc and cefepime  - No need for TTE to r/o endocarditis and myocarditis  - if recurrent fever, will pan-scan and start gentamycin

## 2019-04-17 NOTE — DISCHARGE NOTE PROVIDER - HOSPITAL COURSE
89 yo M c PMH of HTN, AS S/P TAVR , HLD, TIA  and DMT2 presents s/p fall x 2 and fever. Per patient and daughter Darleen, he has been having imbalance issues for years. In the ED, Tmax 101.4F and tachy to 110. Labs were remarkable for no leukocytosis, a mild adnemia, and elevated creatinine of 1.8, Mild transaminitis (AST/ALT, 51/64), UA & RVP negative. CTH and X-ray negative for acute pathologies. He was admitted to medicine for fall and fever workup. He was empirically started on Vancomycin and Cefepime. Antibiotics were de-escalated to Vancomycin.  He was seen by PT and recommended. One blood culture was positive for Lactobacilius. On 4/17, the patient was seen and evaluated and deemed medically stable for discharge. 89 yo M c PMH of HTN, AS S/P TAVR , HLD, TIA  and DMT2 presents s/p fall x 2 and fever. Per patient and daughter Darleen, he has been having imbalance issues for years. In the ED, Tmax 101.4F and tachy to 110. Labs were remarkable for no leukocytosis, a mild adnemia, and elevated creatinine of 1.8, Mild transaminitis (AST/ALT, 51/64), UA & RVP negative. CTH and X-ray negative for acute pathologies. He was admitted to medicine for fall and fever workup. He was empirically started on Vancomycin and Cefepime. Antibiotics were de-escalated to Vancomycin.  TTE was negative for endocarditis. He was seen by PT and recommended outpatient PT. One blood culture was positive for Lactobacilius, with repeat blood cx negative, this likely represent contaminant. Patient has been afebrile throughout the hospital stay and was deemed medically stable for discharge.

## 2019-04-17 NOTE — DISCHARGE NOTE PROVIDER - NSDCCPCAREPLAN_GEN_ALL_CORE_FT
PRINCIPAL DISCHARGE DIAGNOSIS  Diagnosis: Weakness  Assessment and Plan of Treatment: HOME CARE INSTRUCTIONS  Have someone stay with you until you feel stable.  Do not drive, operate machinery, or play sports until your caregiver says it is okay.  Keep all follow-up appointments as directed by your caregiver.   Lie down right away if you start feeling like you might faint. Breathe deeply and steadily. Wait until all the symptoms have passed. Drink enough fluids to keep your urine clear or pale yellow.  Get up slowly, taking several minutes to sit and then stand. This can reduce dizziness.  SEEK IMMEDIATE MEDICAL CARE IF:  You have a severe headache.  You have unusual pain in the chest, abdomen, or back.  You are bleeding from the mouth or rectum, or you have black or tarry stool.  You have an irregular or very fast heartbeat.  You have pain with breathing.  You have repeated fainting or seizure-like jerking during an episode.  You faint when sitting or lying down.  You have confusion.  You have difficulty walking.  You have severe weakness.  You have vision problems.      SECONDARY DISCHARGE DIAGNOSES  Diagnosis: Fever, unspecified fever cause  Assessment and Plan of Treatment: Call you Health care provider upon arrival home to make a one week follow up appointment.  If you develop fever, chills, malaise, or change in mental status call your Health Care Provider or go to the Emergency Department.  Nutrition is important, eat small frequent meals to help ensure you get adequate calories.  Do not stay in bed all day!  Increase your activity daily as tolerated. PRINCIPAL DISCHARGE DIAGNOSIS  Diagnosis: Weakness  Assessment and Plan of Treatment: HOME CARE INSTRUCTIONS  Have someone stay with you until you feel stable.  Do not drive, operate machinery, or play sports until your caregiver says it is okay.  Keep all follow-up appointments as directed by your caregiver.   Lie down right away if you start feeling like you might faint. Breathe deeply and steadily. Wait until all the symptoms have passed. Drink enough fluids to keep your urine clear or pale yellow.  Get up slowly, taking several minutes to sit and then stand. This can reduce dizziness.  SEEK IMMEDIATE MEDICAL CARE IF:  You have a severe headache.  You have unusual pain in the chest, abdomen, or back.  You are bleeding from the mouth or rectum, or you have black or tarry stool.  You have an irregular or very fast heartbeat.  You have pain with breathing.  You have repeated fainting or seizure-like jerking during an episode.  You faint when sitting or lying down.  You have confusion.  You have difficulty walking.  You have severe weakness.  You have vision problems.      SECONDARY DISCHARGE DIAGNOSES  Diagnosis: Fever, unspecified fever cause  Assessment and Plan of Treatment: You can in with a fever, and infectious workup has been negative, including urine and blood culture, chest xray and echocardiogram. You likely had a viral infection. Please remain hydrated.   Call you Health care provider upon arrival home to make a one week follow up appointment.  If you develop fever, chills, malaise, or change in mental status call your Health Care Provider or go to the Emergency Department.  Nutrition is important, eat small frequent meals to help ensure you get adequate calories.  Do not stay in bed all day!  Increase your activity daily as tolerated.    Diagnosis: Transaminitis  Assessment and Plan of Treatment: Your liver enzyme was mildly elevated, likely due to your viral infection. We have been holding your atorvastatin. Please talk to your primary care physician to have a repeat blood work check to restart your medications. PRINCIPAL DISCHARGE DIAGNOSIS  Diagnosis: Weakness  Assessment and Plan of Treatment: HOME CARE INSTRUCTIONS  Have someone stay with you until you feel stable.  Do not drive, operate machinery, or play sports until your caregiver says it is okay.  Keep all follow-up appointments as directed by your caregiver.   Lie down right away if you start feeling like you might faint. Breathe deeply and steadily. Wait until all the symptoms have passed. Drink enough fluids to keep your urine clear or pale yellow.  Get up slowly, taking several minutes to sit and then stand. This can reduce dizziness.  SEEK IMMEDIATE MEDICAL CARE IF:  You have a severe headache.  You have unusual pain in the chest, abdomen, or back.  You are bleeding from the mouth or rectum, or you have black or tarry stool.  You have an irregular or very fast heartbeat.  You have pain with breathing.  You have repeated fainting or seizure-like jerking during an episode.  You faint when sitting or lying down.  You have confusion.  You have difficulty walking.  You have severe weakness.  You have vision problems.      SECONDARY DISCHARGE DIAGNOSES  Diagnosis: Fever, unspecified fever cause  Assessment and Plan of Treatment: You can in with a fever, and infectious workup has been negative, including urine and blood culture, chest xray and echocardiogram. You likely had a viral infection. Please remain hydrated.   Call you Health care provider upon arrival home to make a one week follow up appointment.  If you develop fever, chills, malaise, or change in mental status call your Health Care Provider or go to the Emergency Department.  Nutrition is important, eat small frequent meals to help ensure you get adequate calories.  Do not stay in bed all day!  Increase your activity daily as tolerated.

## 2019-04-17 NOTE — PROGRESS NOTE ADULT - ATTENDING COMMENTS
Seen, examined the patient  - sitting in chair, no complaints, remains afebrile, no diarrhea    Reviewed labs- blood c/s positive for Lactobacillus (could be contaminant)   - Off of IV anbx. Echo- no Veg, moderate MR, s/p TAVR, EF 50-60%  - PT evaluated, rec- outpatient PT and Rolling walker  - d/c planning to home tomorrow  ** spoke to Dtr- Dr Reilly.
Seen, examined the patient  - sitting in chair, no complaints, remains afebrile    Reviewed labs- blood c/s positive- gm positive rods (could be contaminant)   - On IV Vanco, Repeat blood c/s done. Echo   - PT evaluated, rec- outpatient PT and Rolling walker  - d/c planning to home in 1-2 days if blood c/s negative    ** spoke to Dtr- Dr Reilly
Seen, examined the patient  - Sitting in bed, eating, Dtr at bedside, no acute complaints, remains afebrile    Reviewed labs, imaging ( no Fx, no Pna)  - Observe off antibiotic, f/u blood and urine c/s  - PT eval, fall precaution  - d/c planning in progress

## 2019-04-17 NOTE — DISCHARGE NOTE PROVIDER - CARE PROVIDER_API CALL
Srinath Smyth)  Cardiovascular Disease; Interventional Cardiology  [address] [place] [address]  Phone: [phone]  Fax: [phone]  Follow Up Time:

## 2019-04-18 ENCOUNTER — TRANSCRIPTION ENCOUNTER (OUTPATIENT)
Age: 84
End: 2019-04-18

## 2019-04-18 VITALS
HEART RATE: 72 BPM | RESPIRATION RATE: 18 BRPM | TEMPERATURE: 98 F | DIASTOLIC BLOOD PRESSURE: 69 MMHG | OXYGEN SATURATION: 96 % | SYSTOLIC BLOOD PRESSURE: 126 MMHG

## 2019-04-18 LAB
GLUCOSE BLDC GLUCOMTR-MCNC: 203 MG/DL — HIGH (ref 70–99)
GLUCOSE BLDC GLUCOMTR-MCNC: 280 MG/DL — HIGH (ref 70–99)

## 2019-04-18 PROCEDURE — 71045 X-RAY EXAM CHEST 1 VIEW: CPT

## 2019-04-18 PROCEDURE — 84132 ASSAY OF SERUM POTASSIUM: CPT

## 2019-04-18 PROCEDURE — 84100 ASSAY OF PHOSPHORUS: CPT

## 2019-04-18 PROCEDURE — 90715 TDAP VACCINE 7 YRS/> IM: CPT

## 2019-04-18 PROCEDURE — 82947 ASSAY GLUCOSE BLOOD QUANT: CPT

## 2019-04-18 PROCEDURE — 82962 GLUCOSE BLOOD TEST: CPT

## 2019-04-18 PROCEDURE — 87798 DETECT AGENT NOS DNA AMP: CPT

## 2019-04-18 PROCEDURE — 72170 X-RAY EXAM OF PELVIS: CPT

## 2019-04-18 PROCEDURE — 81001 URINALYSIS AUTO W/SCOPE: CPT

## 2019-04-18 PROCEDURE — 83036 HEMOGLOBIN GLYCOSYLATED A1C: CPT

## 2019-04-18 PROCEDURE — 83605 ASSAY OF LACTIC ACID: CPT

## 2019-04-18 PROCEDURE — 82330 ASSAY OF CALCIUM: CPT

## 2019-04-18 PROCEDURE — 80048 BASIC METABOLIC PNL TOTAL CA: CPT

## 2019-04-18 PROCEDURE — 99239 HOSP IP/OBS DSCHRG MGMT >30: CPT

## 2019-04-18 PROCEDURE — 85027 COMPLETE CBC AUTOMATED: CPT

## 2019-04-18 PROCEDURE — 70450 CT HEAD/BRAIN W/O DYE: CPT

## 2019-04-18 PROCEDURE — 93306 TTE W/DOPPLER COMPLETE: CPT

## 2019-04-18 PROCEDURE — 87581 M.PNEUMON DNA AMP PROBE: CPT

## 2019-04-18 PROCEDURE — 87040 BLOOD CULTURE FOR BACTERIA: CPT

## 2019-04-18 PROCEDURE — 73060 X-RAY EXAM OF HUMERUS: CPT

## 2019-04-18 PROCEDURE — 84295 ASSAY OF SERUM SODIUM: CPT

## 2019-04-18 PROCEDURE — 83735 ASSAY OF MAGNESIUM: CPT

## 2019-04-18 PROCEDURE — 87486 CHLMYD PNEUM DNA AMP PROBE: CPT

## 2019-04-18 PROCEDURE — 73080 X-RAY EXAM OF ELBOW: CPT

## 2019-04-18 PROCEDURE — 82435 ASSAY OF BLOOD CHLORIDE: CPT

## 2019-04-18 PROCEDURE — 80053 COMPREHEN METABOLIC PANEL: CPT

## 2019-04-18 PROCEDURE — 90471 IMMUNIZATION ADMIN: CPT

## 2019-04-18 PROCEDURE — 85014 HEMATOCRIT: CPT

## 2019-04-18 PROCEDURE — 93005 ELECTROCARDIOGRAM TRACING: CPT

## 2019-04-18 PROCEDURE — 82803 BLOOD GASES ANY COMBINATION: CPT

## 2019-04-18 PROCEDURE — 99285 EMERGENCY DEPT VISIT HI MDM: CPT | Mod: 25

## 2019-04-18 PROCEDURE — 84484 ASSAY OF TROPONIN QUANT: CPT

## 2019-04-18 PROCEDURE — 87633 RESP VIRUS 12-25 TARGETS: CPT

## 2019-04-18 PROCEDURE — 87086 URINE CULTURE/COLONY COUNT: CPT

## 2019-04-18 PROCEDURE — 97161 PT EVAL LOW COMPLEX 20 MIN: CPT

## 2019-04-18 RX ORDER — ATORVASTATIN CALCIUM 80 MG/1
1 TABLET, FILM COATED ORAL
Qty: 0 | Refills: 0 | COMMUNITY
Start: 2019-04-18

## 2019-04-18 RX ORDER — ATORVASTATIN CALCIUM 80 MG/1
10 TABLET, FILM COATED ORAL AT BEDTIME
Qty: 0 | Refills: 0 | Status: DISCONTINUED | OUTPATIENT
Start: 2019-04-18 | End: 2019-04-18

## 2019-04-18 RX ADMIN — HEPARIN SODIUM 5000 UNIT(S): 5000 INJECTION INTRAVENOUS; SUBCUTANEOUS at 05:38

## 2019-04-18 RX ADMIN — Medication 2: at 07:27

## 2019-04-18 RX ADMIN — Medication 3: at 12:09

## 2019-04-18 RX ADMIN — Medication 81 MILLIGRAM(S): at 12:08

## 2019-04-18 NOTE — PROGRESS NOTE ADULT - ASSESSMENT
89 yo M c PMH of HTN, AS S/P TAVR , HLD, TIA presents s/p fall x2, found to meet SIRs criteria in the ED, admitted for fever of unclear etiology and fall workup.
89 yo M c PMH of HTN, AS S/P TAVR , HLD, TIA presents s/p fall x2, found to meet SIRs criteria in the ED, admitted for fever of unclear etiology and fall s/p complete work up negative for infection or cardiac etiology, pending discharge home today.
89 yo M c PMH of HTN, AS S/P TAVR , HLD, TIA presents s/p fall x2, found to meet SIRs criteria in the ED, admitted for fever of unclear etiology and fall workup.
89 yo M c PMH of HTN, AS S/P TAVR , HLD, TIA presents s/p fall x2, found to meet SIRs criteria in the ED, admitted for fever of unclear etiology and fall workup.

## 2019-04-18 NOTE — PROGRESS NOTE ADULT - PROBLEM SELECTOR PLAN 8
On ramipril and metoprolol at home  - BP stable at goal   - hold off restarting home meds given concern for sepsis of unclear etiology and OTTO
On ramipril and metoprolol at home  - BP stable at goal   - hold off restarting home meds given concern for sepsis of unclear etiology and OTTO
On ramipril and metoprolol at home  - BP stable at goal
On ramipril and metoprolol at home  - BP stable at goal   - hold off restarting home meds given concern for sepsis of unclear etiology and OTTO

## 2019-04-18 NOTE — PROGRESS NOTE ADULT - PROBLEM SELECTOR PLAN 1
Resolved - unclear etiology  - HDS, afebrile and infectious work up including bcx negative   - Home today when daugther comes to  him up

## 2019-04-18 NOTE — PROGRESS NOTE ADULT - SUBJECTIVE AND OBJECTIVE BOX
CONTACT Caren Henson MD                                                                                                                                              Internal Medicine PGY-1   [facility] Pager [phone], [facility] Pager [phone]  On weekdays after 7pm and weekends after 12pm, please contact [phone]      Patient is a 89y old  Male who presents with a chief complaint of Fall (17 Apr 2019 11:34)      INTERVAL HPI/OVERNIGHT EVENTS:  - No acute events overnight       T(C): 36.7 (04-18-19 @ 03:58), Max: 37.2 (04-17-19 @ 20:22)  HR: 81 (04-18-19 @ 03:58) (71 - 81)  BP: 141/76 (04-18-19 @ 03:58) (133/75 - 155/76)  RR: 18 (04-18-19 @ 03:58) (16 - 18)  SpO2: 95% (04-18-19 @ 03:58) (95% - 98%)        I&O's Summary    16 Apr 2019 07:01  -  17 Apr 2019 07:00  --------------------------------------------------------  IN: 956 mL / OUT: 1700 mL / NET: -744 mL    17 Apr 2019 07:01  -  18 Apr 2019 06:53  --------------------------------------------------------  IN: 720 mL / OUT: 1350 mL / NET: -630 mL        LABS:                        9.9    9.19  )-----------( 205      ( 17 Apr 2019 09:29 )             28.9     04-17    133<L>  |  99  |  25<H>  ----------------------------<  160<H>  3.8   |  21<L>  |  1.48<H>    Ca    8.4      17 Apr 2019 06:40  Phos  3.1     04-17  Mg     1.8     04-17          CAPILLARY BLOOD GLUCOSE      POCT Blood Glucose.: 195 mg/dL (17 Apr 2019 21:13)  POCT Blood Glucose.: 251 mg/dL (17 Apr 2019 16:21)  POCT Blood Glucose.: 297 mg/dL (17 Apr 2019 11:47)  POCT Blood Glucose.: 181 mg/dL (17 Apr 2019 07:22)          HOSPITAL MEDICATIONS:    MEDICATIONS  (STANDING):  aspirin  chewable 81 milliGRAM(s) Oral daily  BACItracin/polymyxin B Ointment 1 Application(s) Topical at bedtime  dextrose 5%. 1000 milliLiter(s) (50 mL/Hr) IV Continuous <Continuous>  dextrose 50% Injectable 12.5 Gram(s) IV Push once  dextrose 50% Injectable 25 Gram(s) IV Push once  dextrose 50% Injectable 25 Gram(s) IV Push once  heparin  Injectable 5000 Unit(s) SubCutaneous every 8 hours  insulin lispro (HumaLOG) corrective regimen sliding scale   SubCutaneous Before meals and at bedtime      MEDICATIONS  (PRN):  dextrose 40% Gel 15 Gram(s) Oral once PRN Blood Glucose LESS THAN 70 milliGRAM(s)/deciliter  docusate sodium 100 milliGRAM(s) Oral three times a day PRN Constipation  glucagon  Injectable 1 milliGRAM(s) IntraMuscular once PRN Glucose LESS THAN 70 milligrams/deciliter      HOME MEDICATIONS  Home Medications:  aspirin 81 mg oral delayed release capsule:  orally  (23 Jul 2017 17:27)  atorvastatin 10 mg oral tablet: 1 tab(s) orally once a day (at bedtime) (23 Jul 2017 17:27)  metoprolol tartrate 25 mg oral tablet: 2 tab(s) orally in the morning  and  1 tab orally at night (14 Apr 2019 13:43)  Onglyza 2.5 mg oral tablet: 1 tab(s) orally once a day (23 Jul 2017 17:27)  ramipril 5 mg oral capsule: 2 cap(s) orally once a day (14 Apr 2019 13:42) CONTACT Caren Henson MD                                                                                                                                              Internal Medicine PGY-1   [facility] Pager [phone], [facility] Pager [phone]  On weekdays after 7pm and weekends after 12pm, please contact [phone]      Patient is a 89y old  Male who presents with a chief complaint of Fall (17 Apr 2019 11:34)      INTERVAL HPI/OVERNIGHT EVENTS:  - No acute events overnight   - Denies CP, SOB, HA, dizziness, N/V. Tele: sinus 60-90s, PATs to 150 for 2.5sec   - discharge home today when daugther come to pick patient up       T(C): 36.7 (04-18-19 @ 03:58), Max: 37.2 (04-17-19 @ 20:22)  HR: 81 (04-18-19 @ 03:58) (71 - 81)  BP: 141/76 (04-18-19 @ 03:58) (133/75 - 155/76)  RR: 18 (04-18-19 @ 03:58) (16 - 18)  SpO2: 95% (04-18-19 @ 03:58) (95% - 98%)    PHYSICAL EXAM:  GENERAL: NAD, well-developed  HEAD:  Atraumatic, Normocephalic  EYES: EOMI, PERRLA, conjunctiva and sclera clear  NECK: Supple, No JVD  CHEST/LUNG: bibasilar fine crackle; No wheeze  HEART: Regular rate and rhythm; No murmurs, rubs, or gallops  ABDOMEN: Soft, Nontender, Nondistended; Bowel sounds present  EXTREMITIES:  2+ Peripheral Pulses, No clubbing, cyanosis, or edema  PSYCH: AAOx3  NEUROLOGY: non-focal  SKIN: No rashes or lesions        I&O's Summary    16 Apr 2019 07:01  -  17 Apr 2019 07:00  --------------------------------------------------------  IN: 956 mL / OUT: 1700 mL / NET: -744 mL    17 Apr 2019 07:01  -  18 Apr 2019 06:53  --------------------------------------------------------  IN: 720 mL / OUT: 1350 mL / NET: -630 mL        LABS:                        9.9    9.19  )-----------( 205      ( 17 Apr 2019 09:29 )             28.9     04-17    133<L>  |  99  |  25<H>  ----------------------------<  160<H>  3.8   |  21<L>  |  1.48<H>    Ca    8.4      17 Apr 2019 06:40  Phos  3.1     04-17  Mg     1.8     04-17          CAPILLARY BLOOD GLUCOSE      POCT Blood Glucose.: 195 mg/dL (17 Apr 2019 21:13)  POCT Blood Glucose.: 251 mg/dL (17 Apr 2019 16:21)  POCT Blood Glucose.: 297 mg/dL (17 Apr 2019 11:47)  POCT Blood Glucose.: 181 mg/dL (17 Apr 2019 07:22)          HOSPITAL MEDICATIONS:    MEDICATIONS  (STANDING):  aspirin  chewable 81 milliGRAM(s) Oral daily  BACItracin/polymyxin B Ointment 1 Application(s) Topical at bedtime  dextrose 5%. 1000 milliLiter(s) (50 mL/Hr) IV Continuous <Continuous>  dextrose 50% Injectable 12.5 Gram(s) IV Push once  dextrose 50% Injectable 25 Gram(s) IV Push once  dextrose 50% Injectable 25 Gram(s) IV Push once  heparin  Injectable 5000 Unit(s) SubCutaneous every 8 hours  insulin lispro (HumaLOG) corrective regimen sliding scale   SubCutaneous Before meals and at bedtime      MEDICATIONS  (PRN):  dextrose 40% Gel 15 Gram(s) Oral once PRN Blood Glucose LESS THAN 70 milliGRAM(s)/deciliter  docusate sodium 100 milliGRAM(s) Oral three times a day PRN Constipation  glucagon  Injectable 1 milliGRAM(s) IntraMuscular once PRN Glucose LESS THAN 70 milligrams/deciliter      HOME MEDICATIONS  Home Medications:  aspirin 81 mg oral delayed release capsule:  orally  (23 Jul 2017 17:27)  atorvastatin 10 mg oral tablet: 1 tab(s) orally once a day (at bedtime) (23 Jul 2017 17:27)  metoprolol tartrate 25 mg oral tablet: 2 tab(s) orally in the morning  and  1 tab orally at night (14 Apr 2019 13:43)  Onglyza 2.5 mg oral tablet: 1 tab(s) orally once a day (23 Jul 2017 17:27)  ramipril 5 mg oral capsule: 2 cap(s) orally once a day (14 Apr 2019 13:42)

## 2019-04-18 NOTE — DISCHARGE NOTE NURSING/CASE MANAGEMENT/SOCIAL WORK - NSDCDPATPORTLINK_GEN_ALL_CORE
You can access the [facility] Patient Portal, offered by [facility], by registering with the following website: [email or web address]

## 2019-04-18 NOTE — PROGRESS NOTE ADULT - PROBLEM SELECTOR PLAN 6
h/o AS s/p TAVR. Last echo in 2018 with EF 51%   - c/w ASA  - f/u TTE
h/o AS s/p TAVR. Last echo in 2018 with EF 51%   - c/w ASA  - f/u TTE
h/o AS s/p TAVR. Last echo in 2018 with EF 51%   - c/w ASA
h/o AS s/p TAVR. Last echo in 2018 with EF 51%   - c/w ASA  - f/u TTE

## 2019-04-18 NOTE — CHART NOTE - NSCHARTNOTEFT_GEN_A_CORE
Attendeing addendum:  4/18/19, 9am    The patient was found to have mild transaminitis on admission and improved over the course.  This was related to possibly hepatic insufficiency from SIRS. Statin was temporarily on hold.    -Mohsin Khan, MD Attendeing addendum:  4/18/19, 9am    The patient was found to have mild transaminitis on admission and improved over the course.  This was related to possibly acute hepatic insufficiency from SIRS. Statin was temporarily on hold.    -Mohsin Khan, MD

## 2019-04-18 NOTE — PROGRESS NOTE ADULT - PROBLEM SELECTOR PLAN 4
OTTO on CKD. Cr- up to 1.8 this admission, Cr- baseline 1.4 from 2017. Likely pre-renal due to poor po intake/FFT. s/p 2L IVF  - unclear baseline, as last Cr- of 1.4 in 2017  - Improving s/p IVF   - continue trend BMP for now  - if continues to uptrend, will send urine studies
Resolved - OTTO on CKD. Cr- up to 1.8 this admission, Cr- baseline 1.4 from 2017. Likely pre-renal due to poor po intake/FFT. s/p 2L IVF  - unclear baseline, as last Cr of 1.4 in 2017  - stable s/p IVF

## 2019-04-18 NOTE — PROGRESS NOTE ADULT - PROBLEM SELECTOR PLAN 3
met sepsis criteria with fever and tachycardia s/p Vanc and gent in the ED. Multiple fever both at home and in the ED. Unclear source as UA negative and CXR clear. RVP negative.   - management as above
met sepsis criteria with fever and tachycardia s/p Vanc and gent in the ED. Multiple fever both at home and in the ED. Unclear source as UA negative and CXR clear. RVP negative.   - management as above
Resolved - met sepsis criteria with fever and tachycardia s/p Vanc and gent in the ED. Multiple fever both at home and in the ED. Unclear source as UA negative and CXR clear. RVP negative.   - management as above
met sepsis criteria with fever and tachycardia s/p Vanc and gent in the ED. Multiple fever both at home and in the ED. Unclear source as UA negative and CXR clear. RVP negative.   - management as above

## 2019-04-18 NOTE — PROGRESS NOTE ADULT - PROBLEM SELECTOR PLAN 10
PPX: HSQ  Diet: carb consistent  Dispo: PT consulted
PPX: HSQ  Diet: carb consistent  Dispo: home PT

## 2019-04-18 NOTE — PROGRESS NOTE ADULT - PROBLEM SELECTOR PLAN 5
mildly elevated, unclear reason, 2/2 statin vs. sepsis related?   - daily CMP  - hold statin for now

## 2019-04-18 NOTE — PROGRESS NOTE ADULT - PROBLEM SELECTOR PLAN 2
2 falls over the 2 days without prior fall history. Also h/o AS a/p TAVR. CTH w/o acute pathologies. Xrays without fractures of the chest & elbow & pelvis. Etiology broad include cardiac arrythmia vs. orthostatic vs. mechanical vs. infection related vs. TIA. EKG unchanged from prior with PACs.  - No event on tele overnight  - Monitor for 24 hours more and can dc  - PT consulted, shital recs  - fall precautions
2 falls over the 2 days without prior fall history. Also h/o AS a/p TAVR. CTH w/o acute pathologies. Xrays without fractures of the chest & elbow & pelvis. Etiology broad include cardiac arrythmia vs. orthostatic vs. mechanical vs. infection related vs. TIA. EKG unchanged from prior with PACs.  - No event on tele overnight  - Monitor for 24 hours more and can dc  - PT consulted, shital recs  - fall precautions
2 falls over the 2 days without prior fall history. Also h/o AS a/p TAVR. CTH w/o acute pathologies. Xrays without fractures of the chest & elbow & pelvis. Etiology broad include cardiac arrythmia vs. orthostatic vs. mechanical vs. infection related vs. TIA. EKG unchanged from prior with PACs.  - No event on tele overnight  - PT consulted, shital recs  - fall precautions
2 falls over the 2 dayswithout prior fall history. Also h/o AS a/p TAVR. CTH w/o acute pathologies. Xrays without fractures of the chest & elbow & pelvis. Etiology broad include cardiac arrythmia vs. orthostatic vs. mechanical vs. infection related vs. TIA. EKG unchanged from prior with PACs.  - No event on tele overnight  - Monitor for 24 hours more and can dc  - PT consulted, shital recs  - fall precautions

## 2019-04-18 NOTE — PROGRESS NOTE ADULT - PROBLEM SELECTOR PROBLEM 4
OTTO (acute kidney injury)

## 2019-04-18 NOTE — PROGRESS NOTE ADULT - PROBLEM SELECTOR PLAN 9
On Onglyza 25mg daily at home, insulin naive  - ISS for now  - A1C pending
Well controlled on Onglyza 25mg daily at home, insulin naive  - ISS for now  - A1C 7.6

## 2019-04-19 LAB
CULTURE RESULTS: SIGNIFICANT CHANGE UP
SPECIMEN SOURCE: SIGNIFICANT CHANGE UP

## 2019-04-21 LAB
CULTURE RESULTS: SIGNIFICANT CHANGE UP
SPECIMEN SOURCE: SIGNIFICANT CHANGE UP